# Patient Record
Sex: FEMALE | Race: WHITE | NOT HISPANIC OR LATINO
[De-identification: names, ages, dates, MRNs, and addresses within clinical notes are randomized per-mention and may not be internally consistent; named-entity substitution may affect disease eponyms.]

---

## 2017-05-27 ENCOUNTER — TRANSCRIPTION ENCOUNTER (OUTPATIENT)
Age: 67
End: 2017-05-27

## 2019-03-14 ENCOUNTER — INPATIENT (INPATIENT)
Facility: HOSPITAL | Age: 69
LOS: 7 days | Discharge: HOME | DRG: 510 | End: 2019-03-22
Attending: ORTHOPAEDIC SURGERY | Admitting: SURGERY
Payer: MEDICARE

## 2019-03-14 VITALS
DIASTOLIC BLOOD PRESSURE: 79 MMHG | OXYGEN SATURATION: 96 % | HEIGHT: 64 IN | SYSTOLIC BLOOD PRESSURE: 118 MMHG | TEMPERATURE: 98 F | WEIGHT: 145.06 LBS | RESPIRATION RATE: 18 BRPM | HEART RATE: 91 BPM

## 2019-03-14 DIAGNOSIS — S62.102A FRACTURE OF UNSPECIFIED CARPAL BONE, LEFT WRIST, INITIAL ENCOUNTER FOR CLOSED FRACTURE: ICD-10-CM

## 2019-03-14 DIAGNOSIS — I26.99 OTHER PULMONARY EMBOLISM WITHOUT ACUTE COR PULMONALE: ICD-10-CM

## 2019-03-14 DIAGNOSIS — Z98.890 OTHER SPECIFIED POSTPROCEDURAL STATES: Chronic | ICD-10-CM

## 2019-03-14 DIAGNOSIS — Z98.1 ARTHRODESIS STATUS: Chronic | ICD-10-CM

## 2019-03-14 DIAGNOSIS — S42.309A UNSPECIFIED FRACTURE OF SHAFT OF HUMERUS, UNSPECIFIED ARM, INITIAL ENCOUNTER FOR CLOSED FRACTURE: ICD-10-CM

## 2019-03-14 LAB
ALBUMIN SERPL ELPH-MCNC: 3.8 G/DL — SIGNIFICANT CHANGE UP (ref 3.3–5)
ALP SERPL-CCNC: 61 U/L — SIGNIFICANT CHANGE UP (ref 40–120)
ALT FLD-CCNC: 61 U/L — HIGH (ref 10–45)
ANION GAP SERPL CALC-SCNC: 13 MMOL/L — SIGNIFICANT CHANGE UP (ref 5–17)
ANION GAP SERPL CALC-SCNC: 14 MMOL/L — SIGNIFICANT CHANGE UP (ref 5–17)
APPEARANCE UR: CLEAR — SIGNIFICANT CHANGE UP
APTT BLD: 139 SEC — CRITICAL HIGH (ref 27.5–36.3)
APTT BLD: 25.2 SEC — LOW (ref 27.5–36.3)
AST SERPL-CCNC: 77 U/L — HIGH (ref 10–40)
BASOPHILS # BLD AUTO: 0 K/UL — SIGNIFICANT CHANGE UP (ref 0–0.2)
BASOPHILS NFR BLD AUTO: 0.6 % — SIGNIFICANT CHANGE UP (ref 0–2)
BILIRUB SERPL-MCNC: 0.3 MG/DL — SIGNIFICANT CHANGE UP (ref 0.2–1.2)
BILIRUB UR-MCNC: NEGATIVE — SIGNIFICANT CHANGE UP
BLD GP AB SCN SERPL QL: NEGATIVE — SIGNIFICANT CHANGE UP
BUN SERPL-MCNC: 21 MG/DL — SIGNIFICANT CHANGE UP (ref 7–23)
BUN SERPL-MCNC: 29 MG/DL — HIGH (ref 7–23)
CALCIUM SERPL-MCNC: 8.8 MG/DL — SIGNIFICANT CHANGE UP (ref 8.4–10.5)
CALCIUM SERPL-MCNC: 9.4 MG/DL — SIGNIFICANT CHANGE UP (ref 8.4–10.5)
CHLORIDE SERPL-SCNC: 100 MMOL/L — SIGNIFICANT CHANGE UP (ref 96–108)
CHLORIDE SERPL-SCNC: 100 MMOL/L — SIGNIFICANT CHANGE UP (ref 96–108)
CO2 SERPL-SCNC: 24 MMOL/L — SIGNIFICANT CHANGE UP (ref 22–31)
CO2 SERPL-SCNC: 25 MMOL/L — SIGNIFICANT CHANGE UP (ref 22–31)
COLOR SPEC: SIGNIFICANT CHANGE UP
CREAT SERPL-MCNC: 0.82 MG/DL — SIGNIFICANT CHANGE UP (ref 0.5–1.3)
CREAT SERPL-MCNC: 1.02 MG/DL — SIGNIFICANT CHANGE UP (ref 0.5–1.3)
DIFF PNL FLD: ABNORMAL
EOSINOPHIL # BLD AUTO: 0.1 K/UL — SIGNIFICANT CHANGE UP (ref 0–0.5)
EOSINOPHIL NFR BLD AUTO: 1.1 % — SIGNIFICANT CHANGE UP (ref 0–6)
GLUCOSE SERPL-MCNC: 130 MG/DL — HIGH (ref 70–99)
GLUCOSE SERPL-MCNC: 152 MG/DL — HIGH (ref 70–99)
GLUCOSE UR QL: NEGATIVE — SIGNIFICANT CHANGE UP
HCT VFR BLD CALC: 41.5 % — SIGNIFICANT CHANGE UP (ref 34.5–45)
HCT VFR BLD CALC: 41.9 % — SIGNIFICANT CHANGE UP (ref 34.5–45)
HGB BLD-MCNC: 13.8 G/DL — SIGNIFICANT CHANGE UP (ref 11.5–15.5)
HGB BLD-MCNC: 14.2 G/DL — SIGNIFICANT CHANGE UP (ref 11.5–15.5)
INR BLD: 1.13 RATIO — SIGNIFICANT CHANGE UP (ref 0.88–1.16)
KETONES UR-MCNC: NEGATIVE — SIGNIFICANT CHANGE UP
LEUKOCYTE ESTERASE UR-ACNC: NEGATIVE — SIGNIFICANT CHANGE UP
LYMPHOCYTES # BLD AUTO: 2 K/UL — SIGNIFICANT CHANGE UP (ref 1–3.3)
LYMPHOCYTES # BLD AUTO: 23.1 % — SIGNIFICANT CHANGE UP (ref 13–44)
MCHC RBC-ENTMCNC: 28.3 PG — SIGNIFICANT CHANGE UP (ref 27–34)
MCHC RBC-ENTMCNC: 28.4 PG — SIGNIFICANT CHANGE UP (ref 27–34)
MCHC RBC-ENTMCNC: 32.8 GM/DL — SIGNIFICANT CHANGE UP (ref 32–36)
MCHC RBC-ENTMCNC: 34.1 GM/DL — SIGNIFICANT CHANGE UP (ref 32–36)
MCV RBC AUTO: 83.5 FL — SIGNIFICANT CHANGE UP (ref 80–100)
MCV RBC AUTO: 86.1 FL — SIGNIFICANT CHANGE UP (ref 80–100)
MONOCYTES # BLD AUTO: 0.6 K/UL — SIGNIFICANT CHANGE UP (ref 0–0.9)
MONOCYTES NFR BLD AUTO: 7.4 % — SIGNIFICANT CHANGE UP (ref 2–14)
NEUTROPHILS # BLD AUTO: 5.7 K/UL — SIGNIFICANT CHANGE UP (ref 1.8–7.4)
NEUTROPHILS NFR BLD AUTO: 67.8 % — SIGNIFICANT CHANGE UP (ref 43–77)
NITRITE UR-MCNC: NEGATIVE — SIGNIFICANT CHANGE UP
PH UR: 7 — SIGNIFICANT CHANGE UP (ref 5–8)
PLATELET # BLD AUTO: 219 K/UL — SIGNIFICANT CHANGE UP (ref 150–400)
PLATELET # BLD AUTO: 237 K/UL — SIGNIFICANT CHANGE UP (ref 150–400)
POTASSIUM SERPL-MCNC: 2.8 MMOL/L — CRITICAL LOW (ref 3.5–5.3)
POTASSIUM SERPL-MCNC: 3.6 MMOL/L — SIGNIFICANT CHANGE UP (ref 3.5–5.3)
POTASSIUM SERPL-SCNC: 2.8 MMOL/L — CRITICAL LOW (ref 3.5–5.3)
POTASSIUM SERPL-SCNC: 3.6 MMOL/L — SIGNIFICANT CHANGE UP (ref 3.5–5.3)
PROT SERPL-MCNC: 6.9 G/DL — SIGNIFICANT CHANGE UP (ref 6–8.3)
PROT UR-MCNC: ABNORMAL
PROTHROM AB SERPL-ACNC: 12.9 SEC — SIGNIFICANT CHANGE UP (ref 10–12.9)
RBC # BLD: 4.87 M/UL — SIGNIFICANT CHANGE UP (ref 3.8–5.2)
RBC # BLD: 4.98 M/UL — SIGNIFICANT CHANGE UP (ref 3.8–5.2)
RBC # FLD: 12.2 % — SIGNIFICANT CHANGE UP (ref 10.3–14.5)
RBC # FLD: 13 % — SIGNIFICANT CHANGE UP (ref 10.3–14.5)
RH IG SCN BLD-IMP: POSITIVE — SIGNIFICANT CHANGE UP
RH IG SCN BLD-IMP: POSITIVE — SIGNIFICANT CHANGE UP
SODIUM SERPL-SCNC: 138 MMOL/L — SIGNIFICANT CHANGE UP (ref 135–145)
SODIUM SERPL-SCNC: 138 MMOL/L — SIGNIFICANT CHANGE UP (ref 135–145)
SP GR SPEC: >1.05 (ref 1.01–1.02)
UROBILINOGEN FLD QL: NEGATIVE — SIGNIFICANT CHANGE UP
WBC # BLD: 8.4 K/UL — SIGNIFICANT CHANGE UP (ref 3.8–10.5)
WBC # BLD: 8.4 K/UL — SIGNIFICANT CHANGE UP (ref 3.8–10.5)
WBC # FLD AUTO: 8.4 K/UL — SIGNIFICANT CHANGE UP (ref 3.8–10.5)
WBC # FLD AUTO: 8.4 K/UL — SIGNIFICANT CHANGE UP (ref 3.8–10.5)

## 2019-03-14 PROCEDURE — 73110 X-RAY EXAM OF WRIST: CPT | Mod: 26,LT

## 2019-03-14 PROCEDURE — 70486 CT MAXILLOFACIAL W/O DYE: CPT | Mod: 26

## 2019-03-14 PROCEDURE — 99223 1ST HOSP IP/OBS HIGH 75: CPT

## 2019-03-14 PROCEDURE — 74177 CT ABD & PELVIS W/CONTRAST: CPT | Mod: 26

## 2019-03-14 PROCEDURE — 70450 CT HEAD/BRAIN W/O DYE: CPT | Mod: 26

## 2019-03-14 PROCEDURE — 72125 CT NECK SPINE W/O DYE: CPT | Mod: 26

## 2019-03-14 PROCEDURE — 73130 X-RAY EXAM OF HAND: CPT | Mod: 26,50

## 2019-03-14 PROCEDURE — 72170 X-RAY EXAM OF PELVIS: CPT | Mod: 26

## 2019-03-14 PROCEDURE — 73110 X-RAY EXAM OF WRIST: CPT | Mod: 26,LT,77

## 2019-03-14 PROCEDURE — 71045 X-RAY EXAM CHEST 1 VIEW: CPT | Mod: 26

## 2019-03-14 PROCEDURE — 73090 X-RAY EXAM OF FOREARM: CPT | Mod: 26,LT

## 2019-03-14 PROCEDURE — 71260 CT THORAX DX C+: CPT | Mod: 26

## 2019-03-14 PROCEDURE — 73060 X-RAY EXAM OF HUMERUS: CPT | Mod: 26,RT

## 2019-03-14 RX ORDER — ACETAMINOPHEN 500 MG
325 TABLET ORAL EVERY 4 HOURS
Qty: 0 | Refills: 0 | Status: DISCONTINUED | OUTPATIENT
Start: 2019-03-14 | End: 2019-03-19

## 2019-03-14 RX ORDER — HEPARIN SODIUM 5000 [USP'U]/ML
5500 INJECTION INTRAVENOUS; SUBCUTANEOUS EVERY 6 HOURS
Qty: 0 | Refills: 0 | Status: DISCONTINUED | OUTPATIENT
Start: 2019-03-14 | End: 2019-03-17

## 2019-03-14 RX ORDER — HEPARIN SODIUM 5000 [USP'U]/ML
1100 INJECTION INTRAVENOUS; SUBCUTANEOUS
Qty: 25000 | Refills: 0 | Status: DISCONTINUED | OUTPATIENT
Start: 2019-03-14 | End: 2019-03-14

## 2019-03-14 RX ORDER — TETANUS TOXOID, REDUCED DIPHTHERIA TOXOID AND ACELLULAR PERTUSSIS VACCINE, ADSORBED 5; 2.5; 8; 8; 2.5 [IU]/.5ML; [IU]/.5ML; UG/.5ML; UG/.5ML; UG/.5ML
0.5 SUSPENSION INTRAMUSCULAR ONCE
Qty: 0 | Refills: 0 | Status: COMPLETED | OUTPATIENT
Start: 2019-03-14 | End: 2019-03-14

## 2019-03-14 RX ORDER — FENTANYL CITRATE 50 UG/ML
50 INJECTION INTRAVENOUS ONCE
Qty: 0 | Refills: 0 | Status: DISCONTINUED | OUTPATIENT
Start: 2019-03-14 | End: 2019-03-14

## 2019-03-14 RX ORDER — POTASSIUM CHLORIDE 20 MEQ
20 PACKET (EA) ORAL
Qty: 0 | Refills: 0 | Status: DISCONTINUED | OUTPATIENT
Start: 2019-03-14 | End: 2019-03-14

## 2019-03-14 RX ORDER — SUMATRIPTAN SUCCINATE 4 MG/.5ML
1 INJECTION, SOLUTION SUBCUTANEOUS
Qty: 0 | Refills: 0 | COMMUNITY

## 2019-03-14 RX ORDER — TOPIRAMATE 25 MG
100 TABLET ORAL
Qty: 0 | Refills: 0 | Status: DISCONTINUED | OUTPATIENT
Start: 2019-03-14 | End: 2019-03-19

## 2019-03-14 RX ORDER — POTASSIUM CHLORIDE 20 MEQ
40 PACKET (EA) ORAL EVERY 4 HOURS
Qty: 0 | Refills: 0 | Status: COMPLETED | OUTPATIENT
Start: 2019-03-14 | End: 2019-03-15

## 2019-03-14 RX ORDER — TOPIRAMATE 25 MG
1 TABLET ORAL
Qty: 0 | Refills: 0 | COMMUNITY

## 2019-03-14 RX ORDER — VENLAFAXINE HCL 75 MG
1 CAPSULE, EXT RELEASE 24 HR ORAL
Qty: 0 | Refills: 0 | COMMUNITY

## 2019-03-14 RX ORDER — HEPARIN SODIUM 5000 [USP'U]/ML
5000 INJECTION INTRAVENOUS; SUBCUTANEOUS ONCE
Qty: 0 | Refills: 0 | Status: DISCONTINUED | OUTPATIENT
Start: 2019-03-14 | End: 2019-03-14

## 2019-03-14 RX ORDER — OXYCODONE AND ACETAMINOPHEN 5; 325 MG/1; MG/1
2 TABLET ORAL EVERY 6 HOURS
Qty: 0 | Refills: 0 | Status: DISCONTINUED | OUTPATIENT
Start: 2019-03-14 | End: 2019-03-17

## 2019-03-14 RX ORDER — HYDROCHLOROTHIAZIDE 25 MG
3.75 TABLET ORAL
Qty: 0 | Refills: 0 | COMMUNITY

## 2019-03-14 RX ORDER — OXYCODONE AND ACETAMINOPHEN 5; 325 MG/1; MG/1
1 TABLET ORAL EVERY 4 HOURS
Qty: 0 | Refills: 0 | Status: DISCONTINUED | OUTPATIENT
Start: 2019-03-14 | End: 2019-03-17

## 2019-03-14 RX ORDER — HEPARIN SODIUM 5000 [USP'U]/ML
5500 INJECTION INTRAVENOUS; SUBCUTANEOUS ONCE
Qty: 0 | Refills: 0 | Status: COMPLETED | OUTPATIENT
Start: 2019-03-14 | End: 2019-03-14

## 2019-03-14 RX ORDER — ACETAMINOPHEN 500 MG
1000 TABLET ORAL ONCE
Qty: 0 | Refills: 0 | Status: COMPLETED | OUTPATIENT
Start: 2019-03-14 | End: 2019-03-14

## 2019-03-14 RX ORDER — METOPROLOL TARTRATE 50 MG
25 TABLET ORAL DAILY
Qty: 0 | Refills: 0 | Status: DISCONTINUED | OUTPATIENT
Start: 2019-03-14 | End: 2019-03-19

## 2019-03-14 RX ORDER — LIDOCAINE 4 G/100G
1 CREAM TOPICAL DAILY
Qty: 0 | Refills: 0 | Status: DISCONTINUED | OUTPATIENT
Start: 2019-03-14 | End: 2019-03-19

## 2019-03-14 RX ORDER — HYDROMORPHONE HYDROCHLORIDE 2 MG/ML
0.5 INJECTION INTRAMUSCULAR; INTRAVENOUS; SUBCUTANEOUS EVERY 4 HOURS
Qty: 0 | Refills: 0 | Status: DISCONTINUED | OUTPATIENT
Start: 2019-03-14 | End: 2019-03-19

## 2019-03-14 RX ORDER — METOPROLOL TARTRATE 50 MG
25 TABLET ORAL DAILY
Qty: 0 | Refills: 0 | Status: DISCONTINUED | OUTPATIENT
Start: 2019-03-14 | End: 2019-03-14

## 2019-03-14 RX ORDER — HEPARIN SODIUM 5000 [USP'U]/ML
INJECTION INTRAVENOUS; SUBCUTANEOUS
Qty: 25000 | Refills: 0 | Status: DISCONTINUED | OUTPATIENT
Start: 2019-03-14 | End: 2019-03-19

## 2019-03-14 RX ORDER — VENLAFAXINE HCL 75 MG
150 CAPSULE, EXT RELEASE 24 HR ORAL DAILY
Qty: 0 | Refills: 0 | Status: DISCONTINUED | OUTPATIENT
Start: 2019-03-14 | End: 2019-03-19

## 2019-03-14 RX ORDER — PIPERACILLIN AND TAZOBACTAM 4; .5 G/20ML; G/20ML
3.38 INJECTION, POWDER, LYOPHILIZED, FOR SOLUTION INTRAVENOUS ONCE
Qty: 0 | Refills: 0 | Status: DISCONTINUED | OUTPATIENT
Start: 2019-03-14 | End: 2019-03-14

## 2019-03-14 RX ORDER — HEPARIN SODIUM 5000 [USP'U]/ML
2500 INJECTION INTRAVENOUS; SUBCUTANEOUS EVERY 6 HOURS
Qty: 0 | Refills: 0 | Status: DISCONTINUED | OUTPATIENT
Start: 2019-03-14 | End: 2019-03-17

## 2019-03-14 RX ADMIN — OXYCODONE AND ACETAMINOPHEN 2 TABLET(S): 5; 325 TABLET ORAL at 18:12

## 2019-03-14 RX ADMIN — Medication 1000 MILLIGRAM(S): at 07:11

## 2019-03-14 RX ADMIN — FENTANYL CITRATE 50 MICROGRAM(S): 50 INJECTION INTRAVENOUS at 11:09

## 2019-03-14 RX ADMIN — Medication 100 MILLIGRAM(S): at 17:35

## 2019-03-14 RX ADMIN — Medication 25 MILLIGRAM(S): at 17:40

## 2019-03-14 RX ADMIN — OXYCODONE AND ACETAMINOPHEN 2 TABLET(S): 5; 325 TABLET ORAL at 17:39

## 2019-03-14 RX ADMIN — LIDOCAINE 1 PATCH: 4 CREAM TOPICAL at 17:39

## 2019-03-14 RX ADMIN — FENTANYL CITRATE 50 MICROGRAM(S): 50 INJECTION INTRAVENOUS at 04:38

## 2019-03-14 RX ADMIN — HEPARIN SODIUM 1000 UNIT(S)/HR: 5000 INJECTION INTRAVENOUS; SUBCUTANEOUS at 20:13

## 2019-03-14 RX ADMIN — HEPARIN SODIUM 0 UNIT(S)/HR: 5000 INJECTION INTRAVENOUS; SUBCUTANEOUS at 18:58

## 2019-03-14 RX ADMIN — TETANUS TOXOID, REDUCED DIPHTHERIA TOXOID AND ACELLULAR PERTUSSIS VACCINE, ADSORBED 0.5 MILLILITER(S): 5; 2.5; 8; 8; 2.5 SUSPENSION INTRAMUSCULAR at 04:37

## 2019-03-14 RX ADMIN — HEPARIN SODIUM 5500 UNIT(S): 5000 INJECTION INTRAVENOUS; SUBCUTANEOUS at 12:08

## 2019-03-14 RX ADMIN — FENTANYL CITRATE 50 MICROGRAM(S): 50 INJECTION INTRAVENOUS at 07:11

## 2019-03-14 RX ADMIN — LIDOCAINE 1 PATCH: 4 CREAM TOPICAL at 19:12

## 2019-03-14 RX ADMIN — Medication 400 MILLIGRAM(S): at 04:38

## 2019-03-14 RX ADMIN — Medication 40 MILLIEQUIVALENT(S): at 22:33

## 2019-03-14 RX ADMIN — HEPARIN SODIUM 1200 UNIT(S)/HR: 5000 INJECTION INTRAVENOUS; SUBCUTANEOUS at 12:11

## 2019-03-14 RX ADMIN — Medication 40 MILLIEQUIVALENT(S): at 18:40

## 2019-03-14 NOTE — ED PROVIDER NOTE - NS ED ROS FT
CONSTITUTIONAL: No fevers, no chills  Eyes: no visual changes  Ears: no ear drainage, no ear pain  Nose: no nasal congestion  Mouth/Throat: no sore throat  Cardiovascular: +Chest pain  Respiratory: No SOB  Gastrointestinal: No n/v/d, + abd pain  Genitourinary: no dysuria, no hematuria  SKIN: no rashes.  NEURO: no headache  MSK: +neck pain in c-collar

## 2019-03-14 NOTE — ED ADULT NURSE REASSESSMENT NOTE - NS ED NURSE REASSESS COMMENT FT1
Received report from VIKTORIA Rodriguez. Pt A&Ox3, resting, VS stable. Safety checked. pt remains in c-collar, family at bedside, pt states she is feeling better. Comfort care provided. Pt encouraged to call for assist when needed. admitted MD re-assess/dispo Received report from VIKTORIA Rodriguez. Pt A&Ox3, resting, VS stable. Safety checked. pt remains in c-collar, family at bedside, pt states she is feeling better. Comfort care provided. Pt encouraged to call for assist when needed. ortho at bedside to repair L wrist. pending US B/L lower extremities and MD re-assess/dispo

## 2019-03-14 NOTE — ED PROVIDER NOTE - CARE PLAN
Principal Discharge DX:	Pulmonary emboli  Secondary Diagnosis:	Rib fractures  Secondary Diagnosis:	Nose fracture  Secondary Diagnosis:	Distal radius fracture, left

## 2019-03-14 NOTE — H&P ADULT - NSHPPHYSICALEXAM_GEN_ALL_CORE
General: AOx3, NAD  HEENT: blood in nares, midface stable  Neck: nontender, FROM without pain  Chest: R chest wall tenderness  Abd: soft, nontender, nondistended, pelvis stable  Extremities: L wrist deformity, L hand strength limited by pain  CV: normotensive, regular rate  Pulm: nonlabored

## 2019-03-14 NOTE — CONSULT NOTE ADULT - NSICDXPROBLEM_GEN_ALL_CORE_FT
PROBLEM DIAGNOSES  Problem: Fracture of wrist, left, closed, initial encounter  Recommendation: scheduled for Open reduction and internal fixation of wrist  No contraindication to scheduled procedure  pain meds as needed      Problem: Multiple fractures of upper extremity and ribs  Recommendation: continue to follow rib fxs   pain meds aas needed  encourage incentive spiromery     Problem: Pulmonary thromboembolism  Recommendation:

## 2019-03-14 NOTE — H&P ADULT - ASSESSMENT
Patient is a 68y year old female with PMHx of HTN, HLD, migraine, osteopenia, anxiety, and lumbar spinal fusion c/b radicular symptoms and gait instability who presented to the ED after a mechanical fall down 5 step and striking her face against a wall.     Injuries:  1. Nondisplaced Anterior Nasal Spine Fracture  2. Acute Displaced Fractures of R 5th/6th Ribs  3. Ulnar Subluxation of the Proximal Phalanx of the Right Thumb at the MCP  4. Acute Comminuted Impacted Fracture of the Left Distal Radius    Other Findings:  1. R Nephrolithiasis  2. Endometrial Thickening  3. RLL Nodule  4. Thyroid Nodule     Plan:  - Heparin gtt to PE  - BLE US to r/o DVT  - Ortho: plan to take to OR for fixation, will discuss anticoagulation plan  - Plastics: will evaluate for nasal bone fracture and thumb subluxation  - Regular diet; NPO @ [facility] for OR  - Multimodal pain control  - Incentive spirometer    Elias Mejia, PGY2  [phone] Patient is a 68y year old female with PMHx of HTN, HLD, migraine, osteopenia, anxiety, and lumbar spinal fusion c/b radicular symptoms and gait instability who presented to the ED after a mechanical fall down 5 step and striking her face against a wall.     Injuries:  1. Nondisplaced Anterior Nasal Spine Fracture  2. Acute Displaced Fractures of R 5th/6th Ribs  3. Ulnar Subluxation of the Proximal Phalanx of the Right Thumb at the MCP  4. Acute Comminuted Impacted Fracture of the Left Distal Radius    Other Findings:  1. R Nephrolithiasis  2. Endometrial Thickening  3. RLL Nodule  4. Thyroid Nodule     Plan:  - Heparin gtt to PE  - BLE US to r/o DVT  - Ortho: plan to take to OR for fixation, will discuss anticoagulation plan. No contraindication to orthopedic sx plans for the OR, cleared from trauma sx team's perspective.    - Plastics: will evaluate for nasal bone fracture and thumb subluxation  - Regular diet; NPO @ [facility] for OR  - Multimodal pain control  - Incentive spirometer    Elias Mejia, PGY2  [phone]

## 2019-03-14 NOTE — ED ADULT NURSE REASSESSMENT NOTE - NS ED NURSE REASSESS COMMENT FT1
Report received from Lulu BLUM. AOx3, speaking in complete sentences. Pt denies pain at this time. Second heplock placed, heparin infusing as ordered for PE at rate of 12 ml/hr. Awaiting US of lower legs and bed placement at this time. Pt aware of plan of care.

## 2019-03-14 NOTE — ED ADULT NURSE NOTE - OBJECTIVE STATEMENT
68y female presents to ED complaining of a fall. Pt is a/ox4 states that she tripped and fell down some steps at home, hitting her head on the wall. Pt presents via EMS complaining of neck pain, back pain, L wrist pain in C collar and L wrist splint. Pt has dried blood on nose, small abrasion on R eyelid and obvious deformity to L wrist with pulses and sensation present. Pt tender to palp no R chest wall and R upper abdomen. Pt breathing spontaneous and unlabored with lungs clear to auscultation bilaterally. Pt skin is warm, dry and intact with no edema present. Pt abdomen soft, nontender, nondistended. Pt PERRL, with equal strength bilaterally in upper and lower extremities with full sensation.

## 2019-03-14 NOTE — ED ADULT NURSE NOTE - NSIMPLEMENTINTERV_GEN_ALL_ED
Implemented All Fall Risk Interventions:  [place] to call system. Call bell, personal items and telephone within reach. Instruct patient to call for assistance. Room bathroom lighting operational. Non-slip footwear when patient is off stretcher. Physically safe environment: no spills, clutter or unnecessary equipment. Stretcher in lowest position, wheels locked, appropriate side rails in place. Provide visual cue, wrist band, yellow gown, etc. Monitor gait and stability. Monitor for mental status changes and reorient to person, place, and time. Review medications for side effects contributing to fall risk. Reinforce activity limits and safety measures with patient and family.

## 2019-03-14 NOTE — H&P ADULT - ATTENDING COMMENTS
68F fell down stairs, no LOC  seen and examined 03-14-19 @ 2210 as trauma consult    right eyebrow bruising  dry blood in bilateral nares  right lateral chest wall and inferior costal margin tenderness    right anterior and lateral 4-5 minimally displaced rib fractures (radiologic flail)  -pain control    left distal radius Colles fracture  -ORIF by ortho planned for tomorrow    bilateral nasal bone fractures  -f/u ENT as outpatient    LLL subsegmental PE's  bilateral LE DVT (right popliteal and tibial veins, left soleal vein)  -anticoagulate    endometrial thickening  -TVUS and gyn consult to evaluate for endometrial cancer (this could be a risk factor for VTE in addition to recent transatl[place] airplane ride)

## 2019-03-14 NOTE — CONSULT NOTE ADULT - ATTENDING COMMENTS
Agree with above exam and plan. All RBAs discussed. All questions answered. Informed cosnent obtained.

## 2019-03-14 NOTE — ED PROVIDER NOTE - ATTENDING CONTRIBUTION TO CARE
I performed a history and physical exam of the patient and discussed their management with the resident. I reviewed the resident's note and agree with the documented findings and plan of care. I have edited as appropriate. My medical decision making and observations are found above.

## 2019-03-14 NOTE — ED PROVIDER NOTE - MUSCULOSKELETAL, MLM
In C-collar no stepoffs, +C-spine tenderness. In C-collar no stepoffs, +C-spine tenderness. right thumb deformity at 1st mcp, pt able to range. + gross deformity of left wrist with ttp. 2+ pulses and cap refill <2sec.

## 2019-03-14 NOTE — H&P ADULT - NSICDXPASTMEDICALHX_GEN_ALL_CORE_FT
PAST MEDICAL HISTORY:  Anxiety     HLD (hyperlipidemia)     Hypertension     Low back pain potentially associated with radiculopathy     Migraine     Osteopenia

## 2019-03-14 NOTE — CONSULT NOTE ADULT - SUBJECTIVE AND OBJECTIVE BOX
Trauma Consult      CC: L Wrist Pain, L Chest Wall Pain      Patient is a 68y year old female with PMHx of HTN, HLD, migraine, osteopenia, anxiety, and lumbar spinal fusion c/b radicular symptoms and gait instability who presented to the ED after a mechanical fall down 5 step and striking her face against a wall.     HPI:      Primary Survey  A - airway intact  B - bilateral breath sounds and good chest rise  C - initially BP: 106/70 (03-14-19 @ 07:10), palpable pulses in all extremities  D - GCS 15 on arrival  Exposure obtained      Secondary survey  Gen: NAD  HEENT: NC/AT  CV: s1, s2, RRR  Pulm: CTA B/L  Chest: No TTP  Abd: Soft, ND, NT, no rebound, no guarding  Groin: Normal appearing  Ext: Palp radial b/l, palp DP b/l  Back: no TTP, no palpable runoff, stepoff, or deformity    PMH  HLD (hyperlipidemia)    PSH    MEDS    Allergies    No Known Allergies    Intolerances        Social    Labs:                        14.2   8.4   )-----------( 237      ( 14 Mar 2019 04:27 )             41.5     03-14    138  |  100  |  29<H>  ----------------------------<  130<H>  3.6   |  24  |  1.02    Ca    9.4      14 Mar 2019 04:27    TPro  6.9  /  Alb  3.8  /  TBili  0.3  /  DBili  x   /  AST  77<H>  /  ALT  61<H>  /  AlkPhos  61  03-14    PT/INR - ( 14 Mar 2019 04:27 )   PT: 12.9 sec;   INR: 1.13 ratio         PTT - ( 14 Mar 2019 04:27 )  PTT:25.2 sec          Imaging

## 2019-03-14 NOTE — ED ADULT NURSE NOTE - NSSISCREENINGQ2_ED_A_ED
10/23/2017  Samir CINTRON [facility]    DIABETES HOME INSTRUCTIONS    Meal Planning: Decrease Dr. Pepper by one glass per day initiate a glass of milk in place or water  Physical Activity: Continue to walk the dog every day     Diabetes Medication: Increase Lantus to 20 units in the evening and continue Metformin    Blood Sugar Monitoring:  Check 1times a day, before breakfast and. Check if you are not feeling well. Record all blood sugar results and bring to follow-up appointments.    Your Blood Sugar Target is:   before meals; Less than 180 two hours after the start of a meal.    Call Your Doctor Or The Educator If Blood Sugar Is:  Higher than 300 mg/dL or lower than 70 mg/dL twice in a week.    Call Your Doctor For:    blood sugar test strips, lancets, and refills of your medication.    Plan/Recommendations:  1) Increase Lantus to 20 units in the evening.   2)Continue Metformin 1000 mg twice daily.  3) Start decreasing Dr. Pepper. Leave and extra glass in the bottle everyday this week. Change that extra glass to water.  4) Start testing fasting blood sugar daily.  5) Educator will call  on Friday to review blood sugars.        We Suggest Making An Appointment With Your  Doctor's Office (at least every 3-6 months, Eye Doctor (at least yearly), Dentist (at least every 6 months), Foot Doctor (as needed)    Thank you.  Please call me with any questions or concerns.   Virginia Kumar, RN, CDE  Diabetes Nurse Educator      No

## 2019-03-14 NOTE — CONSULT NOTE ADULT - ASSESSMENT
A/P: 68y Female with L distal radius, closed.  Pain control  NWB LUE in splint, keep c/d/I,   Ice/elevation  Si/sx compartment syndrome discussed with patient, told to alert nurse if she develops symptoms  Patient will need operative fixation of left distal radius fracture  Plan for OR tomorrow 3/15 for OR if cleared by TSX/Medcine  Please document clearance in chart  Please hold [employer] AC for OR after midnight if OK with team given presence of PEs  NPO after midnight for OR tomorrow  IVF while NPO  Discussed with Dr. Prado who agrees with above A/P: 68y Female with L distal radius, closed.  Pain control  NWB LUE in splint, keep c/d/I,   Ice/elevation  Si/sx compartment syndrome discussed with patient, told to alert nurse if she develops symptoms  Patient will need operative fixation of left distal radius fracture  Plan for OR tomorrow 3/15 for OR if cleared by TSX/Medcine  Please document clearance in chart  Plan to hold heparin drip 4 hours prior to OR with restart 4 hours post-op  NPO after midnight for OR tomorrow  IVF while NPO  Discussed with Dr. Prado who agrees with above

## 2019-03-14 NOTE — CONSULT NOTE ADULT - ASSESSMENT
69 yo woman with hx of fall at home found to have a left Wrist fx as well as rib fractures. On CT pt found to have subsegmental PEs

## 2019-03-14 NOTE — CONSULT NOTE ADULT - SUBJECTIVE AND OBJECTIVE BOX
68 YOF not on ac s/p fall after slipping on step, pt fell down multiple steps <5 unsure pt was going down stairs in dark. Pt unable to get back up, has left wrist deformity. Pt complaining of neck pain, right sided chest pain, right upper quadrant abdominal pain, generalized back pain. Pt denies any LOC, denies fever, chills cough, no lightheadedness prior to fall, no chest pain prior to fall. Patient found to have a left wrist fx and rib fxs.    MEDICATIONS  (STANDING):  heparin  Infusion.  Unit(s)/Hr (12 mL/Hr) IV Continuous <Continuous>  lidocaine   Patch 1 Patch Transdermal daily  metoprolol succinate ER 25 milliGRAM(s) Oral daily  potassium chloride    Tablet ER 40 milliEquivalent(s) Oral every 4 hours  topiramate 100 milliGRAM(s) Oral two times a day  venlafaxine XR. 150 milliGRAM(s) Oral daily    MEDICATIONS  (PRN):  acetaminophen   Tablet .. 325 milliGRAM(s) Oral every 4 hours PRN Mild Pain (1 - 3)  heparin  Injectable 2500 Unit(s) IV Push every 6 hours PRN For aPTT between 40 - 57  heparin  Injectable 5500 Unit(s) IV Push every 6 hours PRN For aPTT less than 40  HYDROmorphone  Injectable 0.5 milliGRAM(s) IV Push every 4 hours PRN break through pain  oxyCODONE    5 mG/acetaminophen 325 mG 1 Tablet(s) Oral every 4 hours PRN Moderate Pain (4 - 6)  oxyCODONE    5 mG/acetaminophen 325 mG 2 Tablet(s) Oral every 6 hours PRN Severe Pain (7 - 10)          VITALS:   T(C): 36.7 (03-14-19 @ 17:12), Max: 36.7 (03-14-19 @ 17:12)  HR: 71 (03-14-19 @ 17:12) (67 - 91)  BP: 111/71 (03-14-19 @ 17:12) (99/68 - 118/79)  RR: 20 (03-14-19 @ 17:12) (16 - 20)  SpO2: 95% (03-14-19 @ 17:12) (95% - 100%)  Wt(kg): --    Physical Exam  General: WN/WD NAD  Neurology: A&Ox3, nonfocal, [place] x 4  Eyes: PERRLA/ EOMI, Gross vision intact  ENT/Neck: Neck supple, trachea midline, No JVD, Gross hearing intact  Respiratory: decreased effort due to pain  CV: RRR, S1S2, no murmurs, rubs or gallops  Abdominal: Soft, NT, ND +BS,   Extremities: left wrist in splint  Skin: No Rashes, Hematoma, Ecchymosis      LABS:        CBC Full  -  ( 14 Mar 2019 18:04 )  WBC Count : 8.4 K/uL  Hemoglobin : 13.8 g/dL  Hematocrit : 41.9 %  Platelet Count - Automated : 219 K/uL  Mean Cell Volume : 86.1 fl  Mean Cell Hemoglobin : 28.3 pg  Mean Cell Hemoglobin Concentration : 32.8 gm/dL  Auto Neutrophil # : x  Auto Lymphocyte # : x  Auto Monocyte # : x  Auto Eosinophil # : x  Auto Basophil # : x  Auto Neutrophil % : x  Auto Lymphocyte % : x  Auto Monocyte % : x  Auto Eosinophil % : x  Auto Basophil % : x    03-14    138  |  100  |  21  ----------------------------<  152<H>  2.8<LL>   |  25  |  0.82    Ca    8.8      14 Mar 2019 17:04    TPro  6.9  /  Alb  3.8  /  TBili  0.3  /  DBili  x   /  AST  77<H>  /  ALT  61<H>  /  AlkPhos  61  03-14    LIVER FUNCTIONS - ( 14 Mar 2019 04:27 )  Alb: 3.8 g/dL / Pro: 6.9 g/dL / ALK PHOS: 61 U/L / ALT: 61 U/L / AST: 77 U/L / GGT: x           PT/INR - ( 14 Mar 2019 04:27 )   PT: 12.9 sec;   INR: 1.13 ratio         PTT - ( 14 Mar 2019 18:04 )  PTT:139.0 sec  Urinalysis Basic - ( 14 Mar 2019 07:26 )    Color: Light Yellow / Appearance: Clear / SG: >1.050 / pH: x  Gluc: x / Ketone: Negative  / Bili: Negative / Urobili: Negative   Blood: x / Protein: Trace / Nitrite: Negative   Leuk Esterase: Negative / RBC: 5 /hpf / WBC 1 /HPF   Sq Epi: x / Non Sq Epi: 2 /hpf / Bacteria: Negative    < from: CT Chest w/ IV Cont (03.14.19 @ 04:34) >    EXAM:  CT ABDOMEN AND PELVIS IC                          EXAM:  CT CHEST IC                            PROCEDURE DATE:  03/14/2019            INTERPRETATION:  CLINICAL INFORMATION: Status post fall down 5-6 steps.    Right-sided chest pain and right upper quadrant pain.    COMPARISON: None.    PROCEDURE:   CT of the Chest, Abdomen and Pelvis was performed with intravenous   contrast.   Imaging was performed through the chest in the arterial phase followed by   imaging of the abdomen and pelvis in the portal venous phase.  Intravenous contrast: 90 ml Omnipaque 350. 10 ml discarded.  Oral contrast:None.  Sagittal and coronal reformats were performed.    FINDINGS:    CHEST:     LUNGS AND LARGE AIRWAYS: Patent central airways.  Bibasilar subsegmental   atelectasis.  2.5 mm right upper lobe pulmonary nodule (2:31).  Nodular   opacity in the right lower lobe measures 5.5 x 4 mm (4:309).  PLEURA: No   pleural effusion or pneumothorax.  VESSELS: No acute traumatic aortic injury.  There are small segmental   subsegmental pulmonary emboli in the medial basal and anterior basal   segments of the left lower lobe.  HEART: Heart size is normal. No pericardial effusion.  MEDIASTINUM AND ALKA: No lymphadenopathy.  CHEST WALL AND LOWER NECK: Heterogeneous left thyroid nodules measuring   2.5 x 2.4 cm (2:11) and 2.3 x 1.8 cm (2:14).  Right thyroid nodule   measuring 1.4 x 1 cm (2:7).  BONES: Mildly displaced fractures of the lateral right fifth and sixth   ribs.  Approximately 4.5 x 2 cm lesion in the proximal right humerus   appears to contain chondroid matrix and probably represent an   enchondroma.      ABDOMEN AND PELVIS:         LIVER: A 4-5 mm hypoattenuating focus in segment 7 (3:28) is too small to   characterize by CT scan.  BILE DUCTS: Normal caliber.  GALLBLADDER: Within normal limits.  SPLEEN: Within normal limits.  PANCREAS: Within normal limits.  ADRENALS: Within normal limits.  KIDNEYS/URETERS: Kidneys enhance symmetrically without hydronephrosis.    There are two punctate nonobstructing right lower pole renal stones that   both measure less than 2 mm (4:565 and 4:568).  A 3 mm hypoattenuating   focus in the right kidney (3:40) is too small to characterize by CT scan.    BLADDER: Within normal limits.  REPRODUCTIVE ORGANS: Endometrium measures approximately 7 mm.  A 1.8 x   1.1 cm lesion along the left side of the uterine fundus appears separate   from the left ovary and may reflect a pedunculated, subserosal fibroid   (3:87).     BOWEL: No bowel obstruction.  Appendix not visualized; no secondary   signs of appendicitis.  No colonic diverticular disease.  PERITONEUM: No   hemoperitoneum, ascites or free air.  VESSELS:  There is a sort segment severe stenosis in the proximal celiac   trunk which appears due to compression from the median arcuate ligament.  RETROPERITONEUM: No retroperitoneal hemorrhage.  No lymphadenopathy.    ABDOMINAL WALL: Within normal limits.  BONES: Thoracolumbar scoliosis and multilevel degenerative changes in the   spine.  The patient is status post L4-S1 posterior spinal fusion with   left-sided pedicle screws at L4, L5 and S1.  There are also interbody   fusions at L4-L5 and L5-S1.  Spinal hardware appears intact.  There is   bony ankylosis of the facet joints at L4-L5 bilaterally and L5-S1 on the   left.  Bony pelvis is intact.  A comminuted impacted fracture of the   distal left radius is incidentally noted.    CRITICAL VALUE: Acute pulmonary embolism was reported to Dr. Palacios in the   emergency department on 03/14/2019 at 5:00 AM.  Critical value policy of   the hospital was followed. Read back and confirmation of receipt of this   communication was performed. This verbal communication supplements the   text report of this document.    IMPRESSION:        CHEST:  1.  Acute displaced fractures of the right fifth and sixth ribs.  No   focal infiltrate, pleural effusion or pneumothorax.  2.  Small segmental and subsegmental pulmonary emboli are seen in the   medial basal and anterior basal segments of the left lower lobe.  3.  Right lower lobe nodular opacity measures 5.5 x 4 mm.  Based on 2017   Fleischner Society criteria this does not necessitate any additional   follow-up imaging.  However, if the patient is a smoker or has other risk   factors for lung cancer an optional follow-up chest CT scan can be   considered in twelve months to exclude lesion growth.  4.  Heterogeneous thyroid nodules measuring up to 2.5 cm.  Outpatient   thyroid ultrasound is recommended for further characterization.  5.  Bone lesion in the proximal right humerus probably represents an   enchondroma.  Dedicated humerus radiographs can be obtained for further   characterization.    ABDOMEN/PELVIS:  1.  Comminuted displaced fracture of the distal left radius is   incidentally noted.  Otherwise there is no evidence of acute traumatic   injury in the abdomen or pelvis.  2.  Endometrium measures approximately 7 mm which is thickened for the   patient's age.  Vaginal bleeding should be excluded clinically.  A 1.8 x   1.1 cm lesion along the left side of the uterine fundus appears separate   from the left ovary may reflect a pedunculated, subserosal fibroid.    Pelvic ultrasound is recommended for further characterization.  3. 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  Median arcuate ligament syndrome should be excluded   clinically.  4.  Punctate nonobstructing right lower pole renal stones.

## 2019-03-14 NOTE — H&P ADULT - HISTORY OF PRESENT ILLNESS
Patient is a 68y year old female with PMHx of HTN, HLD, migraine, osteopenia, anxiety, and lumbar spinal fusion c/b radicular symptoms and gait instability who presented to the ED after a mechanical fall down 5 step and striking her face against a wall.     The patient states that she was walking down the stairs in the dark, when she missed a step, falling forward. She did not fall to the floor, but was unable to catch her balance, and struck her face against the wall at the bottom of the steps. The patient denies any LOC as a results of the accident. She was ambulatory immediately afterwards.     Upon presentation to the ED, the patient complained of left wrist pain, right chest wall pain, and nose pain. The patient denied any vision changed, headache, neck pain, back pain, abdominal pain, nausea, dizziness, or lightheadedness.

## 2019-03-14 NOTE — H&P ADULT - NSHPLABSRESULTS_GEN_ALL_CORE
CBC (03-14 @ 04:27)                              14.2                           8.4     )----------------(  237        67.8  % Neutrophils, 23.1  % Lymphocytes, ANC: 5.7                                 41.5                  BMP (03-14 @ 04:27)             138     |  100     |  29<H> 		Ca++ --      Ca 9.4                ---------------------------------( 130<H>		Mg --                 3.6     |  24      |  1.02  			Ph --        LFTs (03-14 @ 04:27)      TPro 6.9 / Alb 3.8 / TBili 0.3 / DBili -- / AST 77<H> / ALT 61<H> / AlkPhos 61    Coags (03-14 @ 04:27)  aPTT 25.2<L> / INR 1.13 / PT 12.9    CT Head No Cont (03.14.19 @ 04:47)     CT Head: No acute intracranial hemorrhage or calvarial fracture. Right   frontal scalp soft tissue swelling.    CT maxillofacial: Question nondisplaced fracture of the anterior nasal   spine. No additional maxillofacial bone fracture.    CT cervical spine: No acute cervical spine fracture or evidence of   traumatic malalignment.    Rounded partially calcified lesion posterior and inferior to the left   thyroid lobe. Further evaluation with nonemergent ultrasound is   recommended.    CHEST (03.14.19 @ 04:34):  1.  Acute displaced fractures of the right fifth and sixth ribs.  No   focal infiltrate, pleural effusion or pneumothorax.  2.  Small segmental and subsegmental pulmonary emboli are seen in the   medial basal and anterior basal segments of the left lower lobe.  3.  Right lower lobe nodular opacity measures 5.5 x 4 mm.  Based on 2017   Fleischner Society criteria this does not necessitate any additional   follow-up imaging.  However, if the patient is a smoker or has other risk   factors for lung cancer an optional follow-up chest CT scan can be   considered in twelve months to exclude lesion growth.  4.  Heterogeneous thyroid nodules measuring up to 2.5 cm.  Outpatient   thyroid ultrasound is recommended for further characterization.  5.  Bone lesion in the proximal right humerus probably represents an   enchondroma.  Dedicated humerus radiographs can be obtained for further   characterization.    ABDOMEN/PELVIS (03.14.19 @ 04:34):  1.  Comminuted displaced fracture of the distal left radius is   incidentally noted.  Otherwise there is no evidence of acute traumatic   injury in the abdomen or pelvis.  2.  Endometrium measures approximately 7 mm which is thickened for the   patient's age.  Vaginal bleeding should be excluded clinically.  A 1.8 x   1.1 cm lesion along the left side of the uterine fundus appears separate   from the left ovary may reflect a pedunculated, subserosal fibroid.    Pelvic ultrasound is recommended for further characterization.  3. 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  Median arcuate ligament syndrome should be excluded   clinically.  4.  Punctate nonobstructing right lower pole renal stones.    Xray Hand 3 Views, Bilateral (03.14.19 @ 05:09)   1.  Ulnar subluxation of the proximal phalanx of the right thumb at the   metacarpophalangeal joint.  2.  Acute comminuted impacted fracture of the distal left radius with   associated soft tissue swelling.     Xray Wrist 3 Views, Left (03.14.19 @ 05:09)   Acute comminuted impacted fracture of the left distal radius with   associated soft tissue swelling.

## 2019-03-14 NOTE — ED PROVIDER NOTE - OBJECTIVE STATEMENT
68 YOF not on ac s/p fall after slip 68 YOF not on ac s/p fall after slipping on step, pt fell down multiple steps <5 unsure pt was going down stairs in dark. Pt unable to get back up, has left wrist deformity. Pt complaining of neck pain, right sided chest pain, right upper quadrant abdominal pain, generalized back pain. Pt denies any LOC, denies fever, chills cough, no lightheadedness prior to fall, no chest pain prior to fall.

## 2019-03-14 NOTE — CONSULT NOTE ADULT - SUBJECTIVE AND OBJECTIVE BOX
68y Female community ambulatory RHD, presents c/o L wrist pain sp mechanical fall off of steps in her home. Of note the patient was on a long flight yesterday from [place]. The patient reports that she slipped and fell hitting her face and her hand went out to brace her fall, denies LOC but did hit her face and nose on the wall with subsequent nose bleed. Denies numbness/tingling. No other pain/injuries. Denies fevers/chills.     HEALTH ISSUES - PROBLEM Dx:    HLD    MEDICATIONS  (STANDING):    Allergies    No Known Allergies    Intolerances      Imaging: XR demonstrates L distal radius fracture                        14.2   8.4   )-----------( 237      ( 14 Mar 2019 04:27 )             41.5     14 Mar 2019 04:27    138    |  100    |  29     ----------------------------<  130    3.6     |  24     |  1.02     Ca    9.4        14 Mar 2019 04:27    TPro  6.9    /  Alb  3.8    /  TBili  0.3    /  DBili  x      /  AST  77     /  ALT  61     /  AlkPhos  61     14 Mar 2019 04:27    PT/INR - ( 14 Mar 2019 04:27 )   PT: 12.9 sec;   INR: 1.13 ratio         PTT - ( 14 Mar 2019 04:27 )  PTT:25.2 sec  Vital Signs Last 24 Hrs  T(C): 36.5 (03-14-19 @ 07:10), Max: 36.5 (03-14-19 @ 03:52)  T(F): 97.7 (03-14-19 @ 07:10), Max: 97.7 (03-14-19 @ 03:52)  HR: 86 (03-14-19 @ 07:10) (67 - 91)  BP: 106/70 (03-14-19 @ 07:10) (106/70 - 118/79)  BP(mean): --  RR: 17 (03-14-19 @ 07:10) (16 - 18)  SpO2: 99% (03-14-19 @ 07:10) (96% - 100%)    Physical Exam  Gen: NAD, awake and alert. No respiratory distress noted  Head: NCAT, no facial trauma  Neck: Intact AROM, no bony TTP.    LUE: Wrist swelling noted.  Skin intact, +TTP distal radius, no bony ttp elsewhere, compartments soft/compressive, extremity warm/well perfused. No elbow or shoulder tenderness or swelling. 2+ radial pulse, +AIN/PIN/M/U/R, SILT C5-T1  Secondary Survey: Dried blood around nares, C-collar in place, no midline tenderness on exam, right chest wall +TTP, Full ROM of unaffected extremities, Right thumb with no pain with PROM or AROm on exam; able to SLR B/L, SILT Globally, compartments soft, no bony TTP over other bony prominences, no calf TTP B/L, no TTP along axial spine.        Procedure: Verbal consent obtained for procedure from patient, 10 cc 1% lidocaine injected under sterile procedure into L Distal radius fracture site. Time was allowed to achieve anesthetic effect.  Closed reduction performed. Placed in well padded sugartong splint, molded appropriately. Post procedure imaging obtained. Post procedure exam unchanged, NV intact, able to wiggles all fingers, SILT distally.

## 2019-03-14 NOTE — PROVIDER CONTACT NOTE (CRITICAL VALUE NOTIFICATION) - ASSESSMENT
Pt A&OX4, VSS at this time. Pt without s/s of hypokalemia. Pt w/o s/s of chest pain, pressure, or palpitations.

## 2019-03-14 NOTE — CONSULT NOTE ADULT - ASSESSMENT
Patient is a 68y year old female with PMHx of HTN, HLD, migraine, osteopenia, anxiety, and lumbar spinal fusion c/b radicular symptoms and gait instability who presented to the ED after a mechanical fall down 5 step and striking her face against a wall.     Injuries:  1.

## 2019-03-14 NOTE — ED ADULT NURSE NOTE - CHPI ED NUR SYMPTOMS NEG
no confusion/no fever/no bleeding/no deformity/no weakness/no tingling/no vomiting/no loss of consciousness/no numbness

## 2019-03-14 NOTE — ED PROVIDER NOTE - CLINICAL SUMMARY MEDICAL DECISION MAKING FREE TEXT BOX
Michael Landrum DO: 67 yo F BIBEMS s/p mechanical fall down ~ 5 steps. + head trauma, no loc. pt with neck, chest abd pain, left wrist, right thumb pain. no palpitations numbness or weakness. left wrist grossly deformed. ABC immediately assessed and intact. left wrist grossly deformed and right mcp deform buy pt ranging with recurrent deformity in mcp extension. plan: cv monitor, symptom relief, tetanus, labs, ct, xr, reassess. Imaging results as follow acute displaced fx of R 5th/6th Ribs, Comminuted Impacted fx of the Left Distal Radius, Left seg/subseg PE, ulnar subluxation of the Proximal Phalanx of the Right Thumb at the MCP, ?nondisplaced nasal fracture, poss humerus endochondroma,  R non-obstructive nephrolithiasis, RLL Nodule, Thyroid Nodule. Trauma, ortho consulted. results discussed with son at bedside. he reports pt recently [place] from [place]. will discuss with trauma AC for PE. hand to be consulted for right thumb. admit

## 2019-03-15 ENCOUNTER — TRANSCRIPTION ENCOUNTER (OUTPATIENT)
Age: 69
End: 2019-03-15

## 2019-03-15 LAB
ANION GAP SERPL CALC-SCNC: 15 MMOL/L — SIGNIFICANT CHANGE UP (ref 5–17)
APTT BLD: 65.8 SEC — HIGH (ref 27.5–36.3)
APTT BLD: 76.5 SEC — HIGH (ref 27.5–36.3)
APTT BLD: 82.9 SEC — HIGH (ref 27.5–36.3)
BASOPHILS # BLD AUTO: 0.02 K/UL — SIGNIFICANT CHANGE UP (ref 0–0.2)
BASOPHILS NFR BLD AUTO: 0.2 % — SIGNIFICANT CHANGE UP (ref 0–2)
BUN SERPL-MCNC: 17 MG/DL — SIGNIFICANT CHANGE UP (ref 7–23)
CALCIUM SERPL-MCNC: 9.7 MG/DL — SIGNIFICANT CHANGE UP (ref 8.4–10.5)
CHLORIDE SERPL-SCNC: 106 MMOL/L — SIGNIFICANT CHANGE UP (ref 96–108)
CO2 SERPL-SCNC: 19 MMOL/L — LOW (ref 22–31)
CREAT SERPL-MCNC: 0.95 MG/DL — SIGNIFICANT CHANGE UP (ref 0.5–1.3)
EOSINOPHIL # BLD AUTO: 0.01 K/UL — SIGNIFICANT CHANGE UP (ref 0–0.5)
EOSINOPHIL NFR BLD AUTO: 0.1 % — SIGNIFICANT CHANGE UP (ref 0–6)
GLUCOSE SERPL-MCNC: 97 MG/DL — SIGNIFICANT CHANGE UP (ref 70–99)
HCT VFR BLD CALC: 37.4 % — SIGNIFICANT CHANGE UP (ref 34.5–45)
HCT VFR BLD CALC: 44.6 % — SIGNIFICANT CHANGE UP (ref 34.5–45)
HGB BLD-MCNC: 11.8 G/DL — SIGNIFICANT CHANGE UP (ref 11.5–15.5)
HGB BLD-MCNC: 14.5 G/DL — SIGNIFICANT CHANGE UP (ref 11.5–15.5)
IMM GRANULOCYTES NFR BLD AUTO: 0.6 % — SIGNIFICANT CHANGE UP (ref 0–1.5)
INR BLD: 1.27 RATIO — HIGH (ref 0.88–1.16)
LYMPHOCYTES # BLD AUTO: 1.07 K/UL — SIGNIFICANT CHANGE UP (ref 1–3.3)
LYMPHOCYTES # BLD AUTO: 12.5 % — LOW (ref 13–44)
MAGNESIUM SERPL-MCNC: 2 MG/DL — SIGNIFICANT CHANGE UP (ref 1.6–2.6)
MCHC RBC-ENTMCNC: 27.5 PG — SIGNIFICANT CHANGE UP (ref 27–34)
MCHC RBC-ENTMCNC: 28.9 PG — SIGNIFICANT CHANGE UP (ref 27–34)
MCHC RBC-ENTMCNC: 31.6 GM/DL — LOW (ref 32–36)
MCHC RBC-ENTMCNC: 32.6 GM/DL — SIGNIFICANT CHANGE UP (ref 32–36)
MCV RBC AUTO: 87.2 FL — SIGNIFICANT CHANGE UP (ref 80–100)
MCV RBC AUTO: 88.6 FL — SIGNIFICANT CHANGE UP (ref 80–100)
MONOCYTES # BLD AUTO: 0.69 K/UL — SIGNIFICANT CHANGE UP (ref 0–0.9)
MONOCYTES NFR BLD AUTO: 8.1 % — SIGNIFICANT CHANGE UP (ref 2–14)
NEUTROPHILS # BLD AUTO: 6.71 K/UL — SIGNIFICANT CHANGE UP (ref 1.8–7.4)
NEUTROPHILS NFR BLD AUTO: 78.5 % — HIGH (ref 43–77)
PHOSPHATE SERPL-MCNC: 2.7 MG/DL — SIGNIFICANT CHANGE UP (ref 2.5–4.5)
PLATELET # BLD AUTO: 194 K/UL — SIGNIFICANT CHANGE UP (ref 150–400)
PLATELET # BLD AUTO: 222 K/UL — SIGNIFICANT CHANGE UP (ref 150–400)
POTASSIUM SERPL-MCNC: 4.3 MMOL/L — SIGNIFICANT CHANGE UP (ref 3.5–5.3)
POTASSIUM SERPL-SCNC: 4.3 MMOL/L — SIGNIFICANT CHANGE UP (ref 3.5–5.3)
PROTHROM AB SERPL-ACNC: 14.7 SEC — HIGH (ref 10–12.9)
RBC # BLD: 4.29 M/UL — SIGNIFICANT CHANGE UP (ref 3.8–5.2)
RBC # BLD: 5.04 M/UL — SIGNIFICANT CHANGE UP (ref 3.8–5.2)
RBC # FLD: 13.3 % — SIGNIFICANT CHANGE UP (ref 10.3–14.5)
RBC # FLD: 13.8 % — SIGNIFICANT CHANGE UP (ref 10.3–14.5)
SODIUM SERPL-SCNC: 140 MMOL/L — SIGNIFICANT CHANGE UP (ref 135–145)
WBC # BLD: 6.8 K/UL — SIGNIFICANT CHANGE UP (ref 3.8–10.5)
WBC # BLD: 8.55 K/UL — SIGNIFICANT CHANGE UP (ref 3.8–10.5)
WBC # FLD AUTO: 6.8 K/UL — SIGNIFICANT CHANGE UP (ref 3.8–10.5)
WBC # FLD AUTO: 8.55 K/UL — SIGNIFICANT CHANGE UP (ref 3.8–10.5)

## 2019-03-15 PROCEDURE — 70450 CT HEAD/BRAIN W/O DYE: CPT | Mod: 26

## 2019-03-15 PROCEDURE — 73120 X-RAY EXAM OF HAND: CPT | Mod: 26,RT

## 2019-03-15 PROCEDURE — 93970 EXTREMITY STUDY: CPT | Mod: 26

## 2019-03-15 RX ADMIN — HYDROMORPHONE HYDROCHLORIDE 0.5 MILLIGRAM(S): 2 INJECTION INTRAMUSCULAR; INTRAVENOUS; SUBCUTANEOUS at 13:07

## 2019-03-15 RX ADMIN — Medication 100 MILLIGRAM(S): at 21:20

## 2019-03-15 RX ADMIN — LIDOCAINE 1 PATCH: 4 CREAM TOPICAL at 05:22

## 2019-03-15 RX ADMIN — Medication 150 MILLIGRAM(S): at 17:04

## 2019-03-15 RX ADMIN — Medication 100 MILLIGRAM(S): at 05:25

## 2019-03-15 RX ADMIN — HEPARIN SODIUM 1000 UNIT(S)/HR: 5000 INJECTION INTRAVENOUS; SUBCUTANEOUS at 03:17

## 2019-03-15 RX ADMIN — LIDOCAINE 1 PATCH: 4 CREAM TOPICAL at 14:21

## 2019-03-15 RX ADMIN — OXYCODONE AND ACETAMINOPHEN 2 TABLET(S): 5; 325 TABLET ORAL at 17:56

## 2019-03-15 RX ADMIN — Medication 40 MILLIEQUIVALENT(S): at 02:14

## 2019-03-15 RX ADMIN — Medication 62.5 MILLIMOLE(S): at 17:03

## 2019-03-15 RX ADMIN — HYDROMORPHONE HYDROCHLORIDE 0.5 MILLIGRAM(S): 2 INJECTION INTRAMUSCULAR; INTRAVENOUS; SUBCUTANEOUS at 13:22

## 2019-03-15 RX ADMIN — HEPARIN SODIUM 1000 UNIT(S)/HR: 5000 INJECTION INTRAVENOUS; SUBCUTANEOUS at 10:05

## 2019-03-15 RX ADMIN — OXYCODONE AND ACETAMINOPHEN 2 TABLET(S): 5; 325 TABLET ORAL at 10:10

## 2019-03-15 RX ADMIN — Medication 25 MILLIGRAM(S): at 05:25

## 2019-03-15 RX ADMIN — OXYCODONE AND ACETAMINOPHEN 2 TABLET(S): 5; 325 TABLET ORAL at 09:12

## 2019-03-15 RX ADMIN — LIDOCAINE 1 PATCH: 4 CREAM TOPICAL at 17:20

## 2019-03-15 RX ADMIN — OXYCODONE AND ACETAMINOPHEN 2 TABLET(S): 5; 325 TABLET ORAL at 17:03

## 2019-03-15 NOTE — DISCHARGE NOTE PROVIDER - NSDCFUADDINST_GEN_ALL_CORE_FT
While noted during your hospital course there were some other noted findings: It is pertinent that you follow up with these findings and discuss with your primary care physician:   1. Right Nephrolithiasis (Kidney stone) Discuss with your Primary Physician and consult urologist.   2. Endometrial wall Thickening (Discuss as well with your Gynecologist.)   3. Right lower lobe lung Nodule  4. Thyroid Nodule. (Have repeat Thyroid testing, and imaging, discuss with your PMD) Please follow up with Dr. Prado 7-10 days after your hospital discharge (call for appointment).  PT-non-weight bearing left upper extremity in splint, please keep it clean, dry and intact.  Your dose of Metoprolol has been decreased and a new script has been sent to the pharmacy, please discontinue your old dosing.  You are being treated with Xarelto for your DVT's.  You will take 15mg twice daily x 21 days, then please have either Dr. Prado or your PMD write you a script to change anticoagulation to 20mg daily.  This is to continue for at least 3 months, unless instructed otherwise by your PMD.  You should follow up with your PMD within 1 month of hospital discharge.  Please have doctor remove any sutures postop day 14 and apply steristrips (if applicable).    While noted during your hospital course there were some other noted findings: It is pertinent that you follow up with these findings and discuss with your primary care physician:   1. Right Nephrolithiasis (Kidney stone) Discuss with your Primary Physician and consult urologist.   2. Endometrial wall Thickening (Discuss as well with your Gynecologist.)   3. Right lower lobe lung Nodule  4. Thyroid Nodule. (Have repeat Thyroid testing, and imaging, discuss with your PMD)

## 2019-03-15 NOTE — CHART NOTE - NSCHARTNOTEFT_GEN_A_CORE
Team reached out to Dr. Matta ([phone]) regarding the patient's R thumb subluxation and nasal bone fractures. He had examined the patient yesterday following reduction by the ED of her thumb subluxation, and no further imaging had been performed at that time. Patient's nasal bone fracture with no urgent indication for operative repair. Formal consult to be dictated by Dr. Matta.  --IMTIAZ Pedro, PGY-4

## 2019-03-15 NOTE — PROGRESS NOTE ADULT - ASSESSMENT
Assessment:   68y Female who presents with Other pulmonary embolism without acute cor pulmonale    Plan:  -repeat CT head  -heparin gtt for subsegmental PE  -f/u with plastic surgery regarding nasal bone fractures and thumb subluxation   -f/u ortho for distal radius fracture  -DVT PPX  -Pain control   -OOB as tolerated with assistance   -Diet as tolerated  -Await Gi Lesley Falk   #[id number] 03-15-19 @ 08:08 Assessment:   68y Female who presents with Other pulmonary embolism without acute cor pulmonale    Plan:  -repeat CT head  -heparin gtt for subsegmental PE  -f/u with plastic surgery regarding nasal bone fractures and thumb subluxation   -f/u ortho for distal radius fracture  - PTOT  -DVT PPX  -Pain control   -OOB as tolerated with assistance   -Diet as tolerated        Lanny Falk   #[id number] 03-15-19 @ 08:08

## 2019-03-15 NOTE — DISCHARGE NOTE PROVIDER - HOSPITAL COURSE
EVENTS AS OF 3/14/19        Patient is a 68y year old female with PMHx of HTN, HLD, migraine, osteopenia, anxiety, and lumbar spinal fusion c/b radicular symptoms and gait instability who presented to the ED after a mechanical fall down 5 step and striking her face against a wall.         List of Injuries to date:    1. Nondisplaced Anterior Nasal Spine Fracture    2. Acute Displaced Fractures of R 5th/6th Ribs    3. Ulnar Subluxation of the Proximal Phalanx of the Right Thumb at the MCP    4. Acute Comminuted Impacted Fracture of the Left Distal Radius        Other Findings:    1. R Nephrolithiasis    2. Endometrial Thickening    3. RLL Nodule    4. Thyroid Nodule         Plan:    - Heparin gtt to PE    - BLE US to r/o DVT    - Ortho plan to take to OR for fixation, will discuss anticoagulation plan.  No contraindication to orthopedic sx plans for the OR, cleared from trauma sx team's perspective.      - Plastics to will evaluate for nasal bone fracture and thumb subluxation    - Regular diet; NPO @ [facility] for OR    - Multimodal pain control    - Incentive spirometer        EVENTS AS OF 3/15/2019    -Contacted by orthopedics team that they will not be taking the patient to the OR. The wrist was placed into a stent, and primary team was told the patient can follow up with Dr. Anthony Prado.     - No note left by PRS as of 3/15. Primary team will contact PRS team to fix thumb subluxation and nasal bone fracture.     - Patient will need outpatient follow up for RLL nodule and thyroid nodule EVENTS AS OF 3/14/19        Patient is a 68y year old female with PMHx of HTN, HLD, migraine, osteopenia, anxiety, and lumbar spinal fusion c/b radicular symptoms and gait instability who presented to the ED after a mechanical fall down 5 step and striking her face against a wall.         List of Injuries to date:    1. Nondisplaced Anterior Nasal Spine Fracture    2. Acute Displaced Fractures of R 5th/6th Ribs    3. Ulnar Subluxation of the Proximal Phalanx of the Right Thumb at the MCP    4. Acute Comminuted Impacted Fracture of the Left Distal Radius        Other Findings:    1. R Nephrolithiasis    2. Endometrial Thickening    3. RLL Nodule    4. Thyroid Nodule         Plan:    - Heparin gtt to PE    - BLE US to r/o DVT    - Ortho plan to take to OR for fixation, will discuss anticoagulation plan.  No contraindication to orthopedic sx plans for the OR, cleared from trauma sx team's perspective.      - Plastics to will evaluate for nasal bone fracture and thumb subluxation    - Regular diet; NPO @ [facility] for OR    - Multimodal pain control    - Incentive spirometer        EVENTS AS OF 3/15/2019    -Contacted by orthopedics team that they will not be taking the patient to the OR. The wrist was placed into a stent, and primary team was told the patient can follow up with Dr. Anthony Prado.     - No note left by PRS as of 3/15. Primary team will contact PRS team to fix thumb subluxation and nasal bone fracture.     - Patient will need outpatient follow up for RLL nodule and thyroid nodule    - Primary team will obtain CT head. This is a 68 year old female with PMHx s/o HTN, HLD, migraine, osteopenia, anxiety, and lumbar spinal fusion admitted to [facility] on 3/14/19 after a mechanical fall down stairs.  She was evaluated by Trauma Surgery, and found to have a L distal radius fracture and L 4th/5th rib fractures.  Patient also found to have a non-displaced nasal fracture.  Cleared by Trauma Surgery after her evaluation.  Patient evaluated by Medicine and cleared for operative fixation of her L distal radius fracture.  Patient was found to have DVT's on evaluation and non specific Endometrial thickening.  She was started on Heparin drip preop for the DVT's and Xarelto postop.  On 3/19, patient underwent an uncomplicated ORIF of the L Distal Radius.  Evaluated and treated by PT, recommended for home with outpatient PT.  Remain of hospital stay was unremarkable and patient discharged home.        < from: US Transvaginal (03.20.19 @ 15:47) >        IMPRESSION:        Normal endometrial thickness. Fluid is noted within the endometrial     canal, which corresponds to the low attenuation area noed on CT.        A 0.7 x 0.4x 0.5 cm polyp is seen within the cervix.        Small amount of free fluid in the pelvis.        < end of copied text >            < from: [facility] Duplex Lower Ext Vein Scan, Bilat (03.15.19 @ 10:22) >        Impression:        Acute above the knee DVT in the right lower extremity involving the     popliteal vein, tibioperoneal trunk, posterior tibial and peroneal veins.        Acute below the knee DVT in the left lower extremity confined to the     soleal vein.        < end of copied text >

## 2019-03-15 NOTE — CHART NOTE - NSCHARTNOTEFT_GEN_A_CORE
ACS SURGERY TERTIARY TRAUMA SURVEY  ------------------------------------------------------------------------------------    Date of TTS:   Time:   Admit Date:    Trauma Activation:   Admit GCS: E-     V-     M-     HPI:  Patient is a 68y year old female with PMHx of HTN, HLD, migraine, osteopenia, anxiety, and lumbar spinal fusion c/b radicular symptoms and gait instability who presented to the ED after a mechanical fall down 5 step and striking her face against a wall.     The patient states that she was walking down the stairs in the dark, when she missed a step, falling forward. She did not fall to the floor, but was unable to catch her balance, and struck her face against the wall at the bottom of the steps. The patient denies any LOC as a results of the accident. She was ambulatory immediately afterwards.     Upon presentation to the ED, the patient complained of left wrist pain, right chest wall pain, and nose pain. The patient denied any vision changed, headache, neck pain, back pain, abdominal pain, nausea, dizziness, or lightheadedness. (14 Mar 2019 12:32)      INTERVAL EVENTS: No acute interval events. The patient had a repeat CTH showing no acute intracranial hemorrhage. Feeling well and pain is improved from yesterday. She has been voiding without any issues and tolerating a regular diet. Tolerating heparin gtt and has been therapeutic.     PAST MEDICAL & SURGICAL HISTORY:  Low back pain potentially associated with radiculopathy  Osteopenia  Migraine  Hypertension  Anxiety  HLD (hyperlipidemia)  S/P spinal fusion  H/O laminectomy    [] No significant past history as reviewed with the patient and family    FAMILY HISTORY:    [] Family history not pertinent as reviewed with the patient and family    ALLERGIES: No Known Allergies      CURRENT MEDICATIONS  MEDICATIONS (STANDING): heparin  Infusion.  Unit(s)/Hr IV Continuous <Continuous>  metoprolol succinate ER 25 milliGRAM(s) Oral daily  topiramate 100 milliGRAM(s) Oral two times a day  venlafaxine XR. 150 milliGRAM(s) Oral daily    MEDICATIONS (PRN):acetaminophen   Tablet .. 325 milliGRAM(s) Oral every 4 hours PRN Mild Pain (1 - 3)  heparin  Injectable 2500 Unit(s) IV Push every 6 hours PRN For aPTT between 40 - 57  heparin  Injectable 5500 Unit(s) IV Push every 6 hours PRN For aPTT less than 40  HYDROmorphone  Injectable 0.5 milliGRAM(s) IV Push every 4 hours PRN break through pain  oxyCODONE    5 mG/acetaminophen 325 mG 1 Tablet(s) Oral every 4 hours PRN Moderate Pain (4 - 6)  oxyCODONE    5 mG/acetaminophen 325 mG 2 Tablet(s) Oral every 6 hours PRN Severe Pain (7 - 10)    -----------------------------------------------------------------------------------    VITAL SIGNS:  T(C): 36.8 (03-15-19 @ 21:00), Max: 36.9 (03-15-19 @ 18:06)  HR: 72 (03-15-19 @ 21:00) (65 - 73)  BP: 93/64 (03-15-19 @ 21:00) (93/63 - 110/70)  RR: 18 (03-15-19 @ 21:00) (18 - 19)  SpO2: 95% (03-15-19 @ 21:00) (95% - 97%)    Drug Dosing Weight  Height (cm): 162.56 (14 Mar 2019 03:52)  Weight (kg): 68 (14 Mar 2019 11:00)  BMI (kg/m2): 25.7 (14 Mar 2019 11:00)  BSA (m2): 1.73 (14 Mar 2019 11:00)    03-14 @ 07:01  -  03-15 @ 07:00  --------------------------------------------------------  IN:    heparin  Infusion.: 120 mL  Total IN: 120 mL    OUT:    Voided: 800 mL  Total OUT: 800 mL    Total NET: -680 mL      03-15 @ 07:01  -  03-15 @ 22:57  --------------------------------------------------------  IN:    heparin  Infusion.: 132 mL    Oral Fluid: 410 mL  Total IN: 542 mL    OUT:  Total OUT: 0 mL    Total NET: 542 mL    PHYSICAL EXAM:    General: NAD, Laying in bed comfortably, very conversive.  HEENT: EOMI. Appreciated finger rub bilaterally.   Neck: Soft, supple, full ROM. No cervical or paraspinal tenderness. No obvious stepoffs.   Cardio: Pulse regularly present.  Resp: Good effort, non-labored breathing.   Thorax: No chest wall tenderness.  Back: No thoracic or lumbar tenderness to palpation, no obvious stepoffs.   GI/Abd: Soft, nontender, nondistended.  Vascular: Extremities warm, bilateral radial pulses palpable, bilateral DP/PT palpable.  Skin: Intact, no breakdown.  Musculoskeletal: All 4 extremities moving spontaneously, no limitations. Full ROM of shoulders, elbows, wrists, fingers, knees, ankles bilaterally. No tenderness to palpation of joints or extremities.  Neuro: Strength 5/5 in all extremities bilaterally. Sensation to light touch intact in all extremities bilaterally.     LABS:  CBC (03-15 @ 06:32)                              14.5                           6.8     )----------------(  194        --    % Neutrophils, --    % Lymphocytes, ANC: --                                  44.6    CBC (03-15 @ 03:20)                              11.8                           8.55    )----------------(  222        78.5<H>% Neutrophils, 12.5<L>% Lymphocytes, ANC: 6.71                                37.4      BMP (03-15 @ 06:31)             140     |  106     |  17    		Ca++ --      Ca 9.7                ---------------------------------( 97    		Mg 2.0                4.3     |  19<L>   |  0.95  			Ph 2.7     BMP (03-14 @ 17:04)             138     |  100     |  21    		Ca++ --      Ca 8.8                ---------------------------------( 152<H>		Mg --                 2.8<LL>  |  25      |  0.82  			Ph --          Coags (03-15 @ 09:28)  aPTT 76.5<H> / INR -- / PT --  Coags (03-15 @ 06:33)  aPTT 65.8<H> / INR 1.27<H> / PT 14.7<H>      MICROBIOLOGY:  Urinalysis (03-14 @ 07:26):     Color: Light Yellow / Appearance: Clear / SG: >1.050<!> / pH: 7.0 / Gluc: Negative / Ketones: Negative / Bili: Negative / Urobili: Negative / Protein :Trace<!> / Nitrites: Negative / Leuk.Est: Negative / RBC: 5<H> / WBC: 1 / Sq Epi:  / Non Sq Epi: 2 / Bacteria Negative           ------------------------------------------------------------------------------------------  RADIOLOGICAL FINDINGS REVIEW:      Pelvis Films:    Extremity Films:   - Xray Forearm, Left (03.14.19 @ 08:54) comminuted fracture of the distal left   radius with volar angulation. The bony fragments are in adequate alignment. An overlying cast is in place.  Head CT: < from: CT Head No Cont (03.15.19 @ 12:22. No acute intracranial hemorrhage or mass effect. No displaced calvarial fracture  C-Spine CT: < from: CT Cervical Spine No Cont (03.14.19 @ 04:48) >    CT Head: No acute intracranial hemorrhage or calvarial fracture. Right frontal scalp soft tissue swelling.  CT maxillofacial: Question nondisplaced fracture of the anterior nasal spine. No additional maxillofacial bone fracture.  CT cervical spine: No acute cervical spine fracture or evidence of traumatic malalignment.  Rounded partially calcified lesion posterior and inferior to the left thyroid lobe. Further evaluation with nonemergent ultrasound is recommended.  Chest CT:  1.  Acute displaced fractures of the right fifth and sixth ribs.  No   focal infiltrate, pleural effusion or pneumothorax.  2.  Small segmental and subsegmental pulmonary emboli are seen in the   medial basal and anterior basal segments of the left lower lobe.  3.  Right lower lobe nodular opacity measures 5.5 x 4 mm.  Based on 2017   Fleischner Society criteria this does not necessitate any additional   follow-up imaging.  However, if the patient is a smoker or has other risk   factors for lung cancer an optional follow-up chest CT scan can be   considered in twelve months to exclude lesion growth.  4.  Heterogeneous thyroid nodules measuring up to 2.5 cm.  Outpatient   thyroid ultrasound is recommended for further characterization.  5.  Bone lesion in the proximal right humerus probably represents an   enchondroma.  Dedicated humerus radiographs can be obtained for further   characterization.    ABDOMEN/PELVIS:  1.  Comminuted displaced fracture of the distal left radius is   incidentally noted.  Otherwise there is no evidence of acute traumatic   injury in the abdomen or pelvis.  2.  Endometrium measures approximately 7 mm which is thickened for the   patient's age.  Vaginal bleeding should be excluded clinically.  A 1.8 x   1.1 cm lesion along the left side of the uterine fundus appears separate   from the left ovary may reflect a pedunculated, subserosal fibroid.    Pelvic ultrasound is recommended for further characterization.  3. 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  Median arcuate ligament syndrome should be excluded   clinically.  4.  Punctate nonobstructing right lower pole renal stones.    VA Duplex Lower Ext Vein Scan, Bilat 3/15 In the right leg, the common femoral and femoral veins are patent and compressible. There is occlusive thrombus in the popliteal vein, tibioperoneal trunk, posterior tibial vein and peroneal vein. Absence of compression and venous flow is noted here.    In the left leg, there is occlusive thrombus in the soleal vein of the calf. Absence of compression and venous flow is noted here. No propagation to the popliteal vein or veins of the thigh is noted.    List Injuries Identified to Date:    Left distal radius fracture s/p reduction  Right thumb subluxation  Multiple fractures of ribs  Incidental findings:   - subsegmental pulmonary thromboembolism  - dc hep gtt? Start a NoAC?  - thyroid and lung nodules  - enlarged endometrial cancer      Consults (Date):  [] Neurosurgery   [x] Orthopedic Surgery      INTERPRETATION/ASSESSMENT: 68F with PMHx of HTN, HLD, migraine, osteopenia, anxiety, and lumbar spinal fusion c/b radicular symptoms and gait instability who presented to the ED after a mechanical fall down 5 step and striking her face against a wall.     Plan:  - No contraindication to feeding regular diet  - Heparin gtt to PE, plan to transition to a NOAC if possible  - Duplex study has been formally red  - Ortho: plan to take to OR for fixation on 3/19  - Plastics will evaluate for nasal bone fracture and thumb subluxation  - Multimodal pain control  - Incentive spirometer    ACS Surgery 1836 ACS SURGERY TERTIARY TRAUMA SURVEY  ------------------------------------------------------------------------------------    Date of TTS:   Time:   Admit Date:    Trauma Activation:   Admit GCS: E-4     V-5     M-6     HPI:  Patient is a 68y year old female with PMHx of HTN, HLD, migraine, osteopenia, anxiety, and lumbar spinal fusion c/b radicular symptoms and gait instability who presented to the ED after a mechanical fall down 5 step and striking her face against a wall.     The patient states that she was walking down the stairs in the dark, when she missed a step, falling forward. She did not fall to the floor, but was unable to catch her balance, and struck her face against the wall at the bottom of the steps. The patient denies any LOC as a results of the accident. She was ambulatory immediately afterwards.     Upon presentation to the ED, the patient complained of left wrist pain, right chest wall pain, and nose pain. The patient denied any vision changed, headache, neck pain, back pain, abdominal pain, nausea, dizziness, or lightheadedness. (14 Mar 2019 12:32)      INTERVAL EVENTS: No acute interval events. The patient had a repeat CTH showing no acute intracranial hemorrhage. Feeling well and pain is improved from yesterday. She has been voiding without any issues and tolerating a regular diet. Tolerating heparin gtt and has been therapeutic.     PAST MEDICAL & SURGICAL HISTORY:  Low back pain potentially associated with radiculopathy  Osteopenia  Migraine  Hypertension  Anxiety  HLD (hyperlipidemia)  S/P spinal fusion  H/O laminectomy    [] No significant past history as reviewed with the patient and family    FAMILY HISTORY:    [] Family history not pertinent as reviewed with the patient and family    ALLERGIES: No Known Allergies      CURRENT MEDICATIONS  MEDICATIONS (STANDING): heparin  Infusion.  Unit(s)/Hr IV Continuous <Continuous>  metoprolol succinate ER 25 milliGRAM(s) Oral daily  topiramate 100 milliGRAM(s) Oral two times a day  venlafaxine XR. 150 milliGRAM(s) Oral daily    MEDICATIONS (PRN):acetaminophen   Tablet .. 325 milliGRAM(s) Oral every 4 hours PRN Mild Pain (1 - 3)  heparin  Injectable 2500 Unit(s) IV Push every 6 hours PRN For aPTT between 40 - 57  heparin  Injectable 5500 Unit(s) IV Push every 6 hours PRN For aPTT less than 40  HYDROmorphone  Injectable 0.5 milliGRAM(s) IV Push every 4 hours PRN break through pain  oxyCODONE    5 mG/acetaminophen 325 mG 1 Tablet(s) Oral every 4 hours PRN Moderate Pain (4 - 6)  oxyCODONE    5 mG/acetaminophen 325 mG 2 Tablet(s) Oral every 6 hours PRN Severe Pain (7 - 10)    -----------------------------------------------------------------------------------    VITAL SIGNS:  T(C): 36.8 (03-15-19 @ 21:00), Max: 36.9 (03-15-19 @ 18:06)  HR: 72 (03-15-19 @ 21:00) (65 - 73)  BP: 93/64 (03-15-19 @ 21:00) (93/63 - 110/70)  RR: 18 (03-15-19 @ 21:00) (18 - 19)  SpO2: 95% (03-15-19 @ 21:00) (95% - 97%)    Drug Dosing Weight  Height (cm): 162.56 (14 Mar 2019 03:52)  Weight (kg): 68 (14 Mar 2019 11:00)  BMI (kg/m2): 25.7 (14 Mar 2019 11:00)  BSA (m2): 1.73 (14 Mar 2019 11:00)    03-14 @ 07:01  -  03-15 @ 07:00  --------------------------------------------------------  IN:    heparin  Infusion.: 120 mL  Total IN: 120 mL    OUT:    Voided: 800 mL  Total OUT: 800 mL    Total NET: -680 mL      03-15 @ 07:01  -  03-15 @ 22:57  --------------------------------------------------------  IN:    heparin  Infusion.: 132 mL    Oral Fluid: 410 mL  Total IN: 542 mL    OUT:  Total OUT: 0 mL    Total NET: 542 mL    PHYSICAL EXAM:    General: NAD, Laying in bed comfortably, very conversive, family at the bedside  HEENT: EOMI. PERRL. Appreciated finger rub bilaterally. Ecchymosis over right eye and eyelid, the area is tender to palpation  Neck: Soft, supple, full ROM. No cervical or paraspinal tenderness. No obvious stepoffs.   Cardio: Pulse regular in right wrist  Resp: Good effort, non-labored breathing, able to draw between 500-600 cc of air on IS  Thorax: Tenderness of right chest/right ribs, no obvious swelling or ecchymosis  Back: No thoracic or lumbar tenderness to palpation, no obvious stepoffs.   GI/Abd: Soft, nontender, nondistended.  Vascular: Extremities warm, bilateral radial pulses palpable, bilateral DP/PT palpable.  Skin: Intact, no breakdown.  Musculoskeletal: All 4 extremities moving spontaneously. Limited ROM in left forearm and left hand due to splint and pain, full ROM of shoulders, bilaterally. Full ROM of knees and ankles bilaterally. No tenderness to palpation of joints or extremities. No peripheral edema    LABS:  CBC (03-15 @ 06:32)                              14.5                           6.8     )----------------(  194        --    % Neutrophils, --    % Lymphocytes, ANC: --                                  44.6    CBC (03-15 @ 03:20)                              11.8                           8.55    )----------------(  222        78.5<H>% Neutrophils, 12.5<L>% Lymphocytes, ANC: 6.71                                37.4      BMP (03-15 @ 06:31)             140     |  106     |  17    		Ca++ --      Ca 9.7                ---------------------------------( 97    		Mg 2.0                4.3     |  19<L>   |  0.95  			Ph 2.7     BMP (03-14 @ 17:04)             138     |  100     |  21    		Ca++ --      Ca 8.8                ---------------------------------( 152<H>		Mg --                 2.8<LL>  |  25      |  0.82  			Ph --          Coags (03-15 @ 09:28)  aPTT 76.5<H> / INR -- / PT --  Coags (03-15 @ 06:33)  aPTT 65.8<H> / INR 1.27<H> / PT 14.7<H>      MICROBIOLOGY:  Urinalysis (03-14 @ 07:26):     Color: Light Yellow / Appearance: Clear / SG: >1.050<!> / pH: 7.0 / Gluc: Negative / Ketones: Negative / Bili: Negative / Urobili: Negative / Protein :Trace<!> / Nitrites: Negative / Leuk.Est: Negative / RBC: 5<H> / WBC: 1 / Sq Epi:  / Non Sq Epi: 2 / Bacteria Negative           ------------------------------------------------------------------------------------------  RADIOLOGICAL FINDINGS REVIEW:      Pelvis Films:    Extremity Films:   - Xray Forearm, Left (03.14.19 @ 08:54) comminuted fracture of the distal left   radius with volar angulation. The bony fragments are in adequate alignment. An overlying cast is in place.  Head CT: < from: CT Head No Cont (03.15.19 @ 12:22. No acute intracranial hemorrhage or mass effect. No displaced calvarial fracture  C-Spine CT: < from: CT Cervical Spine No Cont (03.14.19 @ 04:48) >    CT Head: No acute intracranial hemorrhage or calvarial fracture. Right frontal scalp soft tissue swelling.  CT maxillofacial: Question nondisplaced fracture of the anterior nasal spine. No additional maxillofacial bone fracture.  CT cervical spine: No acute cervical spine fracture or evidence of traumatic malalignment.  Rounded partially calcified lesion posterior and inferior to the left thyroid lobe. Further evaluation with nonemergent ultrasound is recommended.  Chest CT:  1.  Acute displaced fractures of the right fifth and sixth ribs.  No   focal infiltrate, pleural effusion or pneumothorax.  2.  Small segmental and subsegmental pulmonary emboli are seen in the   medial basal and anterior basal segments of the left lower lobe.  3.  Right lower lobe nodular opacity measures 5.5 x 4 mm.  Based on 2017   Fleischner Society criteria this does not necessitate any additional   follow-up imaging.  However, if the patient is a smoker or has other risk   factors for lung cancer an optional follow-up chest CT scan can be   considered in twelve months to exclude lesion growth.  4.  Heterogeneous thyroid nodules measuring up to 2.5 cm.  Outpatient   thyroid ultrasound is recommended for further characterization.  5.  Bone lesion in the proximal right humerus probably represents an   enchondroma.  Dedicated humerus radiographs can be obtained for further   characterization.    ABDOMEN/PELVIS:  1.  Comminuted displaced fracture of the distal left radius is   incidentally noted.  Otherwise there is no evidence of acute traumatic   injury in the abdomen or pelvis.  2.  Endometrium measures approximately 7 mm which is thickened for the   patient's age.  Vaginal bleeding should be excluded clinically.  A 1.8 x   1.1 cm lesion along the left side of the uterine fundus appears separate   from the left ovary may reflect a pedunculated, subserosal fibroid.    Pelvic ultrasound is recommended for further characterization.  3. 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  Median arcuate ligament syndrome should be excluded   clinically.  4.  Punctate nonobstructing right lower pole renal stones.    VA Duplex Lower Ext Vein Scan, Bilat 3/15 In the right leg, the common femoral and femoral veins are patent and compressible. There is occlusive thrombus in the popliteal vein, tibioperoneal trunk, posterior tibial vein and peroneal vein. Absence of compression and venous flow is noted here.    In the left leg, there is occlusive thrombus in the soleal vein of the calf. Absence of compression and venous flow is noted here. No propagation to the popliteal vein or veins of the thigh is noted.    List Injuries Identified to Date:    Left distal radius fracture s/p reduction  Right thumb subluxation  Multiple fractures of ribs  Incidental findings:   - subsegmental pulmonary thromboembolism  - dc hep gtt? Start a NoAC?  - thyroid and lung nodules  - enlarged endometrial cancer      Consults (Date):  [] Neurosurgery   [x] Orthopedic Surgery      INTERPRETATION/ASSESSMENT: 68F with PMHx of HTN, HLD, migraine, osteopenia, anxiety, and lumbar spinal fusion c/b radicular symptoms and gait instability who presented to the ED after a mechanical fall down 5 step and striking her face against a wall.     Plan:  - No contraindication to feeding regular diet  - Heparin gtt to PE, plan to transition to a NOAC if possible  - Duplex study has been formally red  - Ortho: plan to take to OR for fixation on 3/19  - Plastics will evaluate for nasal bone fracture and thumb subluxation  - Multimodal pain control  - Incentive spirometer    ACS Surgery [id number]

## 2019-03-15 NOTE — DISCHARGE NOTE PROVIDER - NSDCCPCAREPLAN_GEN_ALL_CORE_FT
PRINCIPAL DISCHARGE DIAGNOSIS  Diagnosis: Distal radius fracture, left  Assessment and Plan of Treatment:       SECONDARY DISCHARGE DIAGNOSES  Diagnosis: Nose fracture  Assessment and Plan of Treatment:     Diagnosis: DVT (deep venous thrombosis)  Assessment and Plan of Treatment: DVT (deep venous thrombosis)    Diagnosis: Rib fractures  Assessment and Plan of Treatment:

## 2019-03-15 NOTE — PROGRESS NOTE ADULT - NSICDXPROBLEM_GEN_ALL_CORE_FT
PROBLEM DIAGNOSES  Problem: Fracture of wrist, left, closed, initial encounter  Recommendation: scheduled for Open reduction and internal fixation of wrist  No contraindication to scheduled procedure  pain meds as needed      Problem: Multiple fractures of upper extremity and ribs  Recommendation: continue to follow rib fxs   pain meds aas needed  encourage incentive spiromery     Problem: Pulmonary thromboembolism  Recommendation: PROBLEM DIAGNOSES  Problem: Fracture of wrist, left, closed, initial encounter  Assessment and Plan:  No contranidicaiton to surgery as planned.    Problem: Multiple fractures of upper extremity and ribs  Assessment and Plan: recommendation as per cardiology    Problem: Pulmonary thromboembolism  Assessment and Plan: recommendation as given by trauma surgery

## 2019-03-15 NOTE — PROGRESS NOTE ADULT - ASSESSMENT
A/P: 68y Female with L distal radius, closed.  Pain control  NWB LUE in splint, keep c/d/I,   Ice/elevation  Si/sx compartment syndrome discussed with patient, told to alert nurse if she develops symptoms  Patient will need operative fixation of left distal radius fracture  Plan for OR today 3/15 for OR   Please document clearance in chart  Plan to hold heparin drip 4 hours prior to OR with restart 4 hours post-op  NPO for OR tomorrow  IVF while NPO  Discussed with Dr. Prado who agrees with above

## 2019-03-15 NOTE — CHART NOTE - NSCHARTNOTEFT_GEN_A_CORE
- Orthopedics attending in touch with patient's primary care doctor  - Provider to RN placed to hold heparin ggt 4 hours before the OR as per request by orthopedics sx    ACS [id number]

## 2019-03-15 NOTE — PROGRESS NOTE ADULT - SUBJECTIVE AND OBJECTIVE BOX
GENERAL SURGERY DAILY PROGRESS NOTE:     Subjective: Patient seen and examined. Patient stable with no overnight events. Patient denies CP/ SOB/ Palpatations. Patient denies N/V. Operative intervention with orthopedics today.     MEDICATIONS  (STANDING):  heparin  Infusion.  Unit(s)/Hr (12 mL/Hr) IV Continuous <Continuous>  lidocaine   Patch 1 Patch Transdermal daily  metoprolol succinate ER 25 milliGRAM(s) Oral daily  topiramate 100 milliGRAM(s) Oral two times a day  venlafaxine XR. 150 milliGRAM(s) Oral daily    MEDICATIONS  (PRN):  acetaminophen   Tablet .. 325 milliGRAM(s) Oral every 4 hours PRN Mild Pain (1 - 3)  heparin  Injectable 2500 Unit(s) IV Push every 6 hours PRN For aPTT between 40 - 57  heparin  Injectable 5500 Unit(s) IV Push every 6 hours PRN For aPTT less than 40  HYDROmorphone  Injectable 0.5 milliGRAM(s) IV Push every 4 hours PRN break through pain  oxyCODONE    5 mG/acetaminophen 325 mG 1 Tablet(s) Oral every 4 hours PRN Moderate Pain (4 - 6)  oxyCODONE    5 mG/acetaminophen 325 mG 2 Tablet(s) Oral every 6 hours PRN Severe Pain (7 - 10)      Vital Signs Last 24 Hrs  T(C): 36.5 (15 Mar 2019 04:10), Max: 36.7 (14 Mar 2019 17:12)  T(F): 97.7 (15 Mar 2019 04:10), Max: 98.1 (14 Mar 2019 17:12)  HR: 65 (15 Mar 2019 04:10) (65 - 87)  BP: 104/68 (15 Mar 2019 04:10) (97/61 - 111/71)  RR: 18 (15 Mar 2019 04:10) (17 - 20)  SpO2: 97% (15 Mar 2019 04:10) (95% - 100%)    I&O's Detail  14 Mar 2019 07:01  -  15 Mar 2019 07:00  --------------------------------------------------------  IN:    heparin  Infusion.: 120 mL  Total IN: 120 mL    OUT:    Voided: 800 mL  Total OUT: 800 mL  Total NET: -680 mL      Daily     Daily Weight in k.9 (15 Mar 2019 04:10)    LABS:                        14.5   6.8   )-----------( 194      ( 15 Mar 2019 06:32 )             44.6     03-15    140  |  106  |  17  ----------------------------<  97  4.3   |  19<L>  |  0.95    Ca    9.7      15 Mar 2019 06:31    TPro  6.9  /  Alb  3.8  /  TBili  0.3  /  DBili  x   /  AST  77<H>  /  ALT  61<H>  /  AlkPhos  61  03-14  PT/INR - ( 15 Mar 2019 06:33 )   PT: 14.7 sec;   INR: 1.27 ratio    PTT - ( 15 Mar 2019 06:33 )  PTT:65.8 sec  Urinalysis Basic - ( 14 Mar 2019 07:26 )    Color: Light Yellow / Appearance: Clear / SG: >1.050 / pH: x  Gluc: x / Ketone: Negative  / Bili: Negative / Urobili: Negative   Blood: x / Protein: Trace / Nitrite: Negative   Leuk Esterase: Negative / RBC: 5 /hpf / WBC 1 /HPF   Sq Epi: x / Non Sq Epi: 2 /hpf / Bacteria: Negative      Physical Exam:   Gen: NAD, AAOx3  Abdomen: soft, NT, ND     Assessment:   68y Female who presents with Other pulmonary embolism without acute cor pulmonale    Plan:  -DVT PPX  -Pain control   -OOB as tolerated with assistance   -Diet as tolerated  -Await Jamee Falk   #[id number] 03-15-19 @ 08:08 GENERAL SURGERY DAILY PROGRESS NOTE:     Subjective: Patient seen and examined. Patient stable with no overnight events. Patient denies CP/ SOB/ Palpatations. Patient denies N/V. Operative intervention with orthopedics today.     MEDICATIONS  (STANDING):  heparin  Infusion.  Unit(s)/Hr (12 mL/Hr) IV Continuous <Continuous>  lidocaine   Patch 1 Patch Transdermal daily  metoprolol succinate ER 25 milliGRAM(s) Oral daily  topiramate 100 milliGRAM(s) Oral two times a day  venlafaxine XR. 150 milliGRAM(s) Oral daily    MEDICATIONS  (PRN):  acetaminophen   Tablet .. 325 milliGRAM(s) Oral every 4 hours PRN Mild Pain (1 - 3)  heparin  Injectable 2500 Unit(s) IV Push every 6 hours PRN For aPTT between 40 - 57  heparin  Injectable 5500 Unit(s) IV Push every 6 hours PRN For aPTT less than 40  HYDROmorphone  Injectable 0.5 milliGRAM(s) IV Push every 4 hours PRN break through pain  oxyCODONE    5 mG/acetaminophen 325 mG 1 Tablet(s) Oral every 4 hours PRN Moderate Pain (4 - 6)  oxyCODONE    5 mG/acetaminophen 325 mG 2 Tablet(s) Oral every 6 hours PRN Severe Pain (7 - 10)      Vital Signs Last 24 Hrs  T(C): 36.5 (15 Mar 2019 04:10), Max: 36.7 (14 Mar 2019 17:12)  T(F): 97.7 (15 Mar 2019 04:10), Max: 98.1 (14 Mar 2019 17:12)  HR: 65 (15 Mar 2019 04:10) (65 - 87)  BP: 104/68 (15 Mar 2019 04:10) (97/61 - 111/71)  RR: 18 (15 Mar 2019 04:10) (17 - 20)  SpO2: 97% (15 Mar 2019 04:10) (95% - 100%)    I&O's Detail  14 Mar 2019 07:01  -  15 Mar 2019 07:00  --------------------------------------------------------  IN:    heparin  Infusion.: 120 mL  Total IN: 120 mL    OUT:    Voided: 800 mL  Total OUT: 800 mL  Total NET: -680 mL      Daily     Daily Weight in k.9 (15 Mar 2019 04:10)    LABS:                        14.5   6.8   )-----------( 194      ( 15 Mar 2019 06:32 )             44.6     03-15    140  |  106  |  17  ----------------------------<  97  4.3   |  19<L>  |  0.95    Ca    9.7      15 Mar 2019 06:31    TPro  6.9  /  Alb  3.8  /  TBili  0.3  /  DBili  x   /  AST  77<H>  /  ALT  61<H>  /  AlkPhos  61  03-14  PT/INR - ( 15 Mar 2019 06:33 )   PT: 14.7 sec;   INR: 1.27 ratio    PTT - ( 15 Mar 2019 06:33 )  PTT:65.8 sec  Urinalysis Basic - ( 14 Mar 2019 07:26 )    Color: Light Yellow / Appearance: Clear / SG: >1.050 / pH: x  Gluc: x / Ketone: Negative  / Bili: Negative / Urobili: Negative   Blood: x / Protein: Trace / Nitrite: Negative   Leuk Esterase: Negative / RBC: 5 /hpf / WBC 1 /HPF   Sq Epi: x / Non Sq Epi: 2 /hpf / Bacteria: Negative      Physical Exam:   Gen: NAD, AAOx3  Abdomen: soft, NT, ND GENERAL SURGERY DAILY PROGRESS NOTE:     Subjective: Patient seen and examined. Patient stable with no overnight events. Patient denies CP/ SOB/ Palpatations. Patient denies N/V. Operative intervention with orthopedics today.     MEDICATIONS  (STANDING):  heparin  Infusion.  Unit(s)/Hr (12 mL/Hr) IV Continuous <Continuous>  lidocaine   Patch 1 Patch Transdermal daily  metoprolol succinate ER 25 milliGRAM(s) Oral daily  topiramate 100 milliGRAM(s) Oral two times a day  venlafaxine XR. 150 milliGRAM(s) Oral daily    MEDICATIONS  (PRN):  acetaminophen   Tablet .. 325 milliGRAM(s) Oral every 4 hours PRN Mild Pain (1 - 3)  heparin  Injectable 2500 Unit(s) IV Push every 6 hours PRN For aPTT between 40 - 57  heparin  Injectable 5500 Unit(s) IV Push every 6 hours PRN For aPTT less than 40  HYDROmorphone  Injectable 0.5 milliGRAM(s) IV Push every 4 hours PRN break through pain  oxyCODONE    5 mG/acetaminophen 325 mG 1 Tablet(s) Oral every 4 hours PRN Moderate Pain (4 - 6)  oxyCODONE    5 mG/acetaminophen 325 mG 2 Tablet(s) Oral every 6 hours PRN Severe Pain (7 - 10)      Vital Signs Last 24 Hrs  T(C): 36.5 (15 Mar 2019 04:10), Max: 36.7 (14 Mar 2019 17:12)  T(F): 97.7 (15 Mar 2019 04:10), Max: 98.1 (14 Mar 2019 17:12)  HR: 65 (15 Mar 2019 04:10) (65 - 87)  BP: 104/68 (15 Mar 2019 04:10) (97/61 - 111/71)  RR: 18 (15 Mar 2019 04:10) (17 - 20)  SpO2: 97% (15 Mar 2019 04:10) (95% - 100%)    I&O's Detail  14 Mar 2019 07:01  -  15 Mar 2019 07:00  --------------------------------------------------------  IN:    heparin  Infusion.: 120 mL  Total IN: 120 mL    OUT:    Voided: 800 mL  Total OUT: 800 mL  Total NET: -680 mL      Daily     Daily Weight in k.9 (15 Mar 2019 04:10)    LABS:                        14.5   6.8   )-----------( 194      ( 15 Mar 2019 06:32 )             44.6     03-15    140  |  106  |  17  ----------------------------<  97  4.3   |  19<L>  |  0.95    Ca    9.7      15 Mar 2019 06:31    TPro  6.9  /  Alb  3.8  /  TBili  0.3  /  DBili  x   /  AST  77<H>  /  ALT  61<H>  /  AlkPhos  61  03-14  PT/INR - ( 15 Mar 2019 06:33 )   PT: 14.7 sec;   INR: 1.27 ratio    PTT - ( 15 Mar 2019 06:33 )  PTT:65.8 sec  Urinalysis Basic - ( 14 Mar 2019 07:26 )    Color: Light Yellow / Appearance: Clear / SG: >1.050 / pH: x  Gluc: x / Ketone: Negative  / Bili: Negative / Urobili: Negative   Blood: x / Protein: Trace / Nitrite: Negative   Leuk Esterase: Negative / RBC: 5 /hpf / WBC 1 /HPF   Sq Epi: x / Non Sq Epi: 2 /hpf / Bacteria: Negative      Physical Exam:   Gen: NAD, AAOx3  Abdomen: soft, NT, ND   Ext: full strength in UEs bilaterally, tenderness over left wrist and hand

## 2019-03-15 NOTE — DISCHARGE NOTE PROVIDER - NSDCACTIVITY_GEN_ALL_CORE
Do not make important decisions/Showering allowed/Do not drive or operate machinery/No heavy lifting/straining Walking - Outdoors allowed/Do not make important decisions/Showering allowed/Stairs allowed/Walking - Indoors allowed/No heavy lifting/straining/Do not drive or operate machinery

## 2019-03-15 NOTE — PROGRESS NOTE ADULT - ASSESSMENT
67 yo woman with hx of fall at home found to have a left Wrist fx as well as rib fractures. On CT pt found to have subsegmental PEs    Patient is a 68y year old female with PMHx of HTN, HLD,  migraine, osteopenia, anxiety, and lumbar spinal fusion c/b radicular symptoms  and gait instability who presented to the ED after a mechanical fall down 5  step and striking her face against a wall at bottom of step. Pt admitted for  s/p fall. Pt injuries  are,Nondisplaced Anterior Nasal Spine Fracture, Acute  Displaced Fractures of R 5th/6th Ribs, Ulnar Subluxation of the Proximal  Phalanx of the Right Thumb at the MCP,. Acute Comminuted Impacted Fracture of  the Left Distal Radius.     Patient on anticoagulation due to DVT which will be managed by surgery ss the exact timing of OR uncertain.            Patient to go to surgery 3/15 or 3/16/19  Medically stable to proceed to OR as planned .

## 2019-03-15 NOTE — PROGRESS NOTE ADULT - SUBJECTIVE AND OBJECTIVE BOX
68 YOF not on ac s/p fall after slipping on step, pt fell down multiple steps <5 unsure pt was going down stairs in dark. Pt unable to get back up, has left wrist deformity. Pt complaining of neck pain, right sided chest pain, right upper quadrant abdominal pain, generalized back pain. Pt denies any LOC, denies fever, chills cough, no lightheadedness prior to fall, no chest pain prior to fall. Patient found to have a left wrist fx and rib fxs.  Pa;isiah scheduled for or late 3/15.    MEDICATIONS  (STANDING):  heparin  Infusion.  Unit(s)/Hr (12 mL/Hr) IV Continuous <Continuous>  lidocaine   Patch 1 Patch Transdermal daily  metoprolol succinate ER 25 milliGRAM(s) Oral daily  potassium chloride    Tablet ER 40 milliEquivalent(s) Oral every 4 hours  topiramate 100 milliGRAM(s) Oral two times a day  venlafaxine XR. 150 milliGRAM(s) Oral daily    MEDICATIONS  (PRN):  acetaminophen   Tablet .. 325 milliGRAM(s) Oral every 4 hours PRN Mild Pain (1 - 3)  heparin  Injectable 2500 Unit(s) IV Push every 6 hours PRN For aPTT between 40 - 57  heparin  Injectable 5500 Unit(s) IV Push every 6 hours PRN For aPTT less than 40  HYDROmorphone  Injectable 0.5 milliGRAM(s) IV Push every 4 hours PRN break through pain  oxyCODONE    5 mG/acetaminophen 325 mG 1 Tablet(s) Oral every 4 hours PRN Moderate Pain (4 - 6)  oxyCODONE    5 mG/acetaminophen 325 mG 2 Tablet(s) Oral every 6 hours PRN Severe Pain (7 - 10)          VITALS:   Vital Signs Last 24 Hrs  T(C): 36.6 (15 Mar 2019 13:00), Max: 36.7 (14 Mar 2019 17:12)  T(F): 97.8 (15 Mar 2019 13:00), Max: 98.1 (14 Mar 2019 17:12)  HR: 66 (15 Mar 2019 13:00) (65 - 74)  BP: 110/70 (15 Mar 2019 13:00) (97/61 - 111/71)  BP(mean): --  RR: 18 (15 Mar 2019 13:00) (18 - 20)  SpO2: 96% (15 Mar 2019 13:00) (95% - 97%)        Physical Exam  General: WN/WD NAD  Neurology: A&Ox3, nonfocal, [place] x 4  Eyes: PERRLA/ EOMI, Gross vision intact  ENT/Neck: Neck supple, trachea midline, No JVD, Gross hearing intact  Respiratory: decreased effort due to pain  CV: RRR, S1S2, no murmurs, rubs or gallops  Abdominal: Soft, NT, ND +BS,   Extremities: left wrist in splint  OR later today  Skin: No Rashes, Hematoma, Ecchymosis      LABS:    CBC Full  -  ( 15 Mar 2019 06:32 )  WBC Count : 6.8 K/uL  Hemoglobin : 14.5 g/dL  Hematocrit : 44.6 %  Platelet Count - Automated : 194 K/uL  Mean Cell Volume : 88.6 fl  Mean Cell Hemoglobin : 28.9 pg  Mean Cell Hemoglobin Concentration : 32.6 gm/dL  Auto Neutrophil # : x  Auto Lymphocyte # : x  Auto Monocyte # : x  Auto Eosinophil # : x  Auto Basophil # : x  Auto Neutrophil % : x  Auto Lymphocyte % : x  Auto Monocyte % : x  Auto Eosinophil % : x  Auto Basophil % : x    03-15    140  |  106  |  17  ----------------------------<  97  4.3   |  19<L>  |  0.95    Ca    9.7      15 Mar 2019 06:31  Phos  2.7     03-15  Mg     2.0     03-15    TPro  6.9  /  Alb  3.8  /  TBili  0.3  /  DBili  x   /  AST  77<H>  /  ALT  61<H>  /  AlkPhos  61  03-14    LIVER FUNCTIONS - ( 14 Mar 2019 04:27 )  Alb: 3.8 g/dL / Pro: 6.9 g/dL / ALK PHOS: 61 U/L / ALT: 61 U/L / AST: 77 U/L / GGT: x           PT/INR - ( 15 Mar 2019 06:33 )   PT: 14.7 sec;   INR: 1.27 ratio         PTT - ( 15 Mar 2019 09:28 )  PTT:76.5 sec  Urinalysis Basic - ( 14 Mar 2019 07:26 )    Color: Light Yellow / Appearance: Clear / SG: >1.050 / pH: x  Gluc: x / Ketone: Negative  / Bili: Negative / Urobili: Negative   Blood: x / Protein: Trace / Nitrite: Negative   Leuk Esterase: Negative / RBC: 5 /hpf / WBC 1 /HPF   Sq Epi: x / Non Sq Epi: 2 /hpf / Bacteria: Negative        CBC Full  -  ( 14 Mar 2019 18:04 )  WBC Count : 8.4 K/uL  Hemoglobin : 13.8 g/dL  Hematocrit : 41.9 %  Platelet Count - Automated : 219 K/uL  Mean Cell Volume : 86.1 fl  Mean Cell Hemoglobin : 28.3 pg  Mean Cell Hemoglobin Concentration : 32.8 gm/dL  Auto Neutrophil # : x  Auto Lymphocyte # : x  Auto Monocyte # : x  Auto Eosinophil # : x  Auto Basophil # : x  Auto Neutrophil % : x  Auto Lymphocyte % : x  Auto Monocyte % : x  Auto Eosinophil % : x  Auto Basophil % : x    03-14    138  |  100  |  21  ----------------------------<  152<H>  2.8<LL>   |  25  |  0.82    Ca    8.8      14 Mar 2019 17:04    TPro  6.9  /  Alb  3.8  /  TBili  0.3  /  DBili  x   /  AST  77<H>  /  ALT  61<H>  /  AlkPhos  61  03-14    LIVER FUNCTIONS - ( 14 Mar 2019 04:27 )  Alb: 3.8 g/dL / Pro: 6.9 g/dL / ALK PHOS: 61 U/L / ALT: 61 U/L / AST: 77 U/L / GGT: x           PT/INR - ( 14 Mar 2019 04:27 )   PT: 12.9 sec;   INR: 1.13 ratio         PTT - ( 14 Mar 2019 18:04 )  PTT:139.0 sec  Urinalysis Basic - ( 14 Mar 2019 07:26 )    Color: Light Yellow / Appearance: Clear / SG: >1.050 / pH: x  Gluc: x / Ketone: Negative  / Bili: Negative / Urobili: Negative   Blood: x / Protein: Trace / Nitrite: Negative   Leuk Esterase: Negative / RBC: 5 /hpf / WBC 1 /HPF   Sq Epi: x / Non Sq Epi: 2 /hpf / Bacteria: Negative    < from: CT Chest w/ IV Cont (03.14.19 @ 04:34) >    EXAM:  CT ABDOMEN AND PELVIS IC                          EXAM:  CT CHEST IC                            PROCEDURE DATE:  03/14/2019            INTERPRETATION:  CLINICAL INFORMATION: Status post fall down 5-6 steps.    Right-sided chest pain and right upper quadrant pain.    COMPARISON: None.    PROCEDURE:   CT of the Chest, Abdomen and Pelvis was performed with intravenous   contrast.   Imaging was performed through the chest in the arterial phase followed by   imaging of the abdomen and pelvis in the portal venous phase.  Intravenous contrast: 90 ml Omnipaque 350. 10 ml discarded.  Oral contrast:None.  Sagittal and coronal reformats were performed.    FINDINGS:    CHEST:     LUNGS AND LARGE AIRWAYS: Patent central airways.  Bibasilar subsegmental   atelectasis.  2.5 mm right upper lobe pulmonary nodule (2:31).  Nodular   opacity in the right lower lobe measures 5.5 x 4 mm (4:309).  PLEURA: No   pleural effusion or pneumothorax.  VESSELS: No acute traumatic aortic injury.  There are small segmental   subsegmental pulmonary emboli in the medial basal and anterior basal   segments of the left lower lobe.  HEART: Heart size is normal. No pericardial effusion.  MEDIASTINUM AND ALKA: No lymphadenopathy.  CHEST WALL AND LOWER NECK: Heterogeneous left thyroid nodules measuring   2.5 x 2.4 cm (2:11) and 2.3 x 1.8 cm (2:14).  Right thyroid nodule   measuring 1.4 x 1 cm (2:7).  BONES: Mildly displaced fractures of the lateral right fifth and sixth   ribs.  Approximately 4.5 x 2 cm lesion in the proximal right humerus   appears to contain chondroid matrix and probably represent an   enchondroma.      ABDOMEN AND PELVIS:         LIVER: A 4-5 mm hypoattenuating focus in segment 7 (3:28) is too small to   characterize by CT scan.  BILE DUCTS: Normal caliber.  GALLBLADDER: Within normal limits.  SPLEEN: Within normal limits.  PANCREAS: Within normal limits.  ADRENALS: Within normal limits.  KIDNEYS/URETERS: Kidneys enhance symmetrically without hydronephrosis.    There are two punctate nonobstructing right lower pole renal stones that   both measure less than 2 mm (4:565 and 4:568).  A 3 mm hypoattenuating   focus in the right kidney (3:40) is too small to characterize by CT scan.    BLADDER: Within normal limits.  REPRODUCTIVE ORGANS: Endometrium measures approximately 7 mm.  A 1.8 x   1.1 cm lesion along the left side of the uterine fundus appears separate   from the left ovary and may reflect a pedunculated, subserosal fibroid   (3:87).     BOWEL: No bowel obstruction.  Appendix not visualized; no secondary   signs of appendicitis.  No colonic diverticular disease.  PERITONEUM: No   hemoperitoneum, ascites or free air.  VESSELS:  There is a sort segment severe stenosis in the proximal celiac   trunk which appears due to compression from the median arcuate ligament.  RETROPERITONEUM: No retroperitoneal hemorrhage.  No lymphadenopathy.    ABDOMINAL WALL: Within normal limits.  BONES: Thoracolumbar scoliosis and multilevel degenerative changes in the   spine.  The patient is status post L4-S1 posterior spinal fusion with   left-sided pedicle screws at L4, L5 and S1.  There are also interbody   fusions at L4-L5 and L5-S1.  Spinal hardware appears intact.  There is   bony ankylosis of the facet joints at L4-L5 bilaterally and L5-S1 on the   left.  Bony pelvis is intact.  A comminuted impacted fracture of the   distal left radius is incidentally noted.    CRITICAL VALUE: Acute pulmonary embolism was reported to Dr. Palacios in the   emergency department on 03/14/2019 at 5:00 AM.  Critical value policy of   the hospital was followed. Read back and confirmation of receipt of this   communication was performed. This verbal communication supplements the   text report of this document.    IMPRESSION:        CHEST:  1.  Acute displaced fractures of the right fifth and sixth ribs.  No   focal infiltrate, pleural effusion or pneumothorax.  2.  Small segmental and subsegmental pulmonary emboli are seen in the   medial basal and anterior basal segments of the left lower lobe.  3.  Right lower lobe nodular opacity measures 5.5 x 4 mm.  Based on 2017   Fleischner Society criteria this does not necessitate any additional   follow-up imaging.  However, if the patient is a smoker or has other risk   factors for lung cancer an optional follow-up chest CT scan can be   considered in twelve months to exclude lesion growth.  4.  Heterogeneous thyroid nodules measuring up to 2.5 cm.  Outpatient   thyroid ultrasound is recommended for further characterization.  5.  Bone lesion in the proximal right humerus probably represents an   enchondroma.  Dedicated humerus radiographs can be obtained for further   characterization.    ABDOMEN/PELVIS:  1.  Comminuted displaced fracture of the distal left radius is   incidentally noted.  Otherwise there is no evidence of acute traumatic   injury in the abdomen or pelvis.  2.  Endometrium measures approximately 7 mm which is thickened for the   patient's age.  Vaginal bleeding should be excluded clinically.  A 1.8 x   1.1 cm lesion along the left side of the uterine fundus appears separate   from the left ovary may reflect a pedunculated, subserosal fibroid.    Pelvic ultrasound is recommended for further characterization.  3. 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  Median arcuate ligament syndrome should be excluded   clinically.  4.  Punctate nonobstructing right lower pole renal stones.

## 2019-03-15 NOTE — DISCHARGE NOTE PROVIDER - NSDCCPTREATMENT_GEN_ALL_CORE_FT
PRINCIPAL PROCEDURE  Procedure: Open reduction and internal fixation (ORIF) of extra-articular fracture of distal radius  Findings and Treatment:

## 2019-03-15 NOTE — DISCHARGE NOTE PROVIDER - CARE PROVIDER_API CALL
Anthony Prado)  Orthopedics  [address], [address]  [place] [address]  Phone: [phone]  Fax: [phone]  Follow Up Time:

## 2019-03-15 NOTE — PROGRESS NOTE ADULT - SUBJECTIVE AND OBJECTIVE BOX
Pt S/E at bedside, no acute events overnight, pain controlled, denies SOB or CP.     Vital Signs Last 24 Hrs  T(C): 36.5 (15 Mar 2019 04:10), Max: 36.7 (14 Mar 2019 17:12)  T(F): 97.7 (15 Mar 2019 04:10), Max: 98.1 (14 Mar 2019 17:12)  HR: 65 (15 Mar 2019 04:10) (65 - 87)  BP: 104/68 (15 Mar 2019 04:10) (97/61 - 111/71)  BP(mean): --  RR: 18 (15 Mar 2019 04:10) (17 - 20)  SpO2: 97% (15 Mar 2019 04:10) (95% - 100%)    Gen: NAD,     Left Upper Extremity:  Dressing clean dry intact  +AIN/PIN/M/R/U/Msc/Ax  SILT C5-T1  +Radial Pulse  Compartments soft  No calf TTP B/L

## 2019-03-15 NOTE — DISCHARGE NOTE PROVIDER - CARE PROVIDERS DIRECT ADDRESSES
[email or web address]

## 2019-03-16 LAB
ANION GAP SERPL CALC-SCNC: 13 MMOL/L — SIGNIFICANT CHANGE UP (ref 5–17)
APTT BLD: 68.6 SEC — HIGH (ref 27.5–36.3)
BUN SERPL-MCNC: 19 MG/DL — SIGNIFICANT CHANGE UP (ref 7–23)
CALCIUM SERPL-MCNC: 8.7 MG/DL — SIGNIFICANT CHANGE UP (ref 8.4–10.5)
CHLORIDE SERPL-SCNC: 105 MMOL/L — SIGNIFICANT CHANGE UP (ref 96–108)
CO2 SERPL-SCNC: 19 MMOL/L — LOW (ref 22–31)
CREAT SERPL-MCNC: 0.8 MG/DL — SIGNIFICANT CHANGE UP (ref 0.5–1.3)
GLUCOSE SERPL-MCNC: 99 MG/DL — SIGNIFICANT CHANGE UP (ref 70–99)
HCT VFR BLD CALC: 39.7 % — SIGNIFICANT CHANGE UP (ref 34.5–45)
HGB BLD-MCNC: 13.3 G/DL — SIGNIFICANT CHANGE UP (ref 11.5–15.5)
INR BLD: 1.28 RATIO — HIGH (ref 0.88–1.16)
MAGNESIUM SERPL-MCNC: 1.8 MG/DL — SIGNIFICANT CHANGE UP (ref 1.6–2.6)
MCHC RBC-ENTMCNC: 29.1 PG — SIGNIFICANT CHANGE UP (ref 27–34)
MCHC RBC-ENTMCNC: 33.6 GM/DL — SIGNIFICANT CHANGE UP (ref 32–36)
MCV RBC AUTO: 86.8 FL — SIGNIFICANT CHANGE UP (ref 80–100)
PHOSPHATE SERPL-MCNC: 2.4 MG/DL — LOW (ref 2.5–4.5)
PLATELET # BLD AUTO: 214 K/UL — SIGNIFICANT CHANGE UP (ref 150–400)
POTASSIUM SERPL-MCNC: 4.2 MMOL/L — SIGNIFICANT CHANGE UP (ref 3.5–5.3)
POTASSIUM SERPL-SCNC: 4.2 MMOL/L — SIGNIFICANT CHANGE UP (ref 3.5–5.3)
PROTHROM AB SERPL-ACNC: 14.7 SEC — HIGH (ref 10–12.9)
RBC # BLD: 4.57 M/UL — SIGNIFICANT CHANGE UP (ref 3.8–5.2)
RBC # FLD: 13.1 % — SIGNIFICANT CHANGE UP (ref 10.3–14.5)
SODIUM SERPL-SCNC: 137 MMOL/L — SIGNIFICANT CHANGE UP (ref 135–145)
WBC # BLD: 7.6 K/UL — SIGNIFICANT CHANGE UP (ref 3.8–10.5)
WBC # FLD AUTO: 7.6 K/UL — SIGNIFICANT CHANGE UP (ref 3.8–10.5)

## 2019-03-16 RX ORDER — MAGNESIUM SULFATE 500 MG/ML
2 VIAL (ML) INJECTION ONCE
Qty: 0 | Refills: 0 | Status: COMPLETED | OUTPATIENT
Start: 2019-03-16 | End: 2019-03-16

## 2019-03-16 RX ADMIN — Medication 25 MILLIGRAM(S): at 05:56

## 2019-03-16 RX ADMIN — LIDOCAINE 1 PATCH: 4 CREAM TOPICAL at 02:31

## 2019-03-16 RX ADMIN — HEPARIN SODIUM 1000 UNIT(S)/HR: 5000 INJECTION INTRAVENOUS; SUBCUTANEOUS at 08:11

## 2019-03-16 RX ADMIN — Medication 100 MILLIGRAM(S): at 05:56

## 2019-03-16 RX ADMIN — LIDOCAINE 1 PATCH: 4 CREAM TOPICAL at 23:40

## 2019-03-16 RX ADMIN — LIDOCAINE 1 PATCH: 4 CREAM TOPICAL at 17:05

## 2019-03-16 RX ADMIN — OXYCODONE AND ACETAMINOPHEN 2 TABLET(S): 5; 325 TABLET ORAL at 06:45

## 2019-03-16 RX ADMIN — LIDOCAINE 1 PATCH: 4 CREAM TOPICAL at 08:11

## 2019-03-16 RX ADMIN — OXYCODONE AND ACETAMINOPHEN 2 TABLET(S): 5; 325 TABLET ORAL at 06:12

## 2019-03-16 RX ADMIN — Medication 62.5 MILLIMOLE(S): at 13:08

## 2019-03-16 RX ADMIN — OXYCODONE AND ACETAMINOPHEN 2 TABLET(S): 5; 325 TABLET ORAL at 15:50

## 2019-03-16 RX ADMIN — Medication 50 GRAM(S): at 10:53

## 2019-03-16 RX ADMIN — OXYCODONE AND ACETAMINOPHEN 2 TABLET(S): 5; 325 TABLET ORAL at 15:21

## 2019-03-16 RX ADMIN — Medication 100 MILLIGRAM(S): at 17:05

## 2019-03-16 RX ADMIN — Medication 150 MILLIGRAM(S): at 08:12

## 2019-03-16 NOTE — PHYSICAL THERAPY INITIAL EVALUATION ADULT - STANDING BALANCE: STATIC
Patient is a 52y old  Female who presents with a chief complaint of SOB (13 Dec 2017 16:01)  Awake, alert, comfortable in bed in NAD. Still awaiting Indium scan. S/p PC Placement    INTERVAL HPI/OVERNIGHT EVENTS:      VITAL SIGNS:  T(F): 98.7 (01-22-18 @ 05:10)  HR: 77 (01-22-18 @ 05:10)  BP: 130/73 (01-22-18 @ 05:10)  RR: 17 (01-22-18 @ 05:10)  SpO2: 100% (01-22-18 @ 05:10)  Wt(kg): --  I&O's Detail          REVIEW OF SYSTEMS:    CONSTITUTIONAL:  No fevers, chills, sweats    HEENT:  Eyes:  No diplopia or blurred vision. ENT:  No earache, sore throat or runny nose.    CARDIOVASCULAR:  No pressure, squeezing, tightness, or heaviness about the chest; no palpitations.    RESPIRATORY:  Per HPI    GASTROINTESTINAL:  No abdominal pain, nausea, vomiting or diarrhea.    GENITOURINARY:  No dysuria, frequency or urgency.    NEUROLOGIC:  No paresthesias, fasciculations, seizures or weakness.    PSYCHIATRIC:  No disorder of thought or mood.      PHYSICAL EXAM:    Constitutional: Well developed and nourished  Eyes:Perrla  ENMT: normal  Neck:supple  Respiratory: good air entry  Cardiovascular: S1 S2 regular  Gastrointestinal: Soft, Non tender  Extremities: No edema  Vascular:normal  Neurological:Awake, alert,Ox3  Musculoskeletal:Normal      MEDICATIONS  (STANDING):  ascorbic acid 500 milliGRAM(s) Oral daily  calamine Lotion 1 Application(s) Topical three times a day  cinacalcet 30 milliGRAM(s) Oral daily  dextrose 5%. 1000 milliLiter(s) (50 mL/Hr) IV Continuous <Continuous>  dextrose 50% Injectable 12.5 Gram(s) IV Push once  dextrose 50% Injectable 25 Gram(s) IV Push once  dextrose 50% Injectable 25 Gram(s) IV Push once  docusate sodium 100 milliGRAM(s) Oral two times a day  epoetin jacqueline Injectable 11998 Unit(s) IV Push <User Schedule>  ferrous    sulfate Liquid 300 milliGRAM(s) Enteral Tube daily  folic acid 1 milliGRAM(s) Oral daily  gabapentin 100 milliGRAM(s) Oral three times a day  heparin  Injectable 5000 Unit(s) SubCutaneous every 12 hours  hydrALAZINE 50 milliGRAM(s) Oral three times a day  insulin lispro (HumaLOG) corrective regimen sliding scale   SubCutaneous Before meals and at bedtime  levothyroxine 50 MICROGram(s) Oral daily  meropenem IVPB 1000 milliGRAM(s) IV Intermittent every 24 hours  meropenem IVPB      metoprolol     tartrate 25 milliGRAM(s) Oral two times a day  nitroglycerin    Patch 0.2 mG/Hr(s) 1 patch Transdermal daily  pantoprazole  Injectable 40 milliGRAM(s) IV Push every 12 hours  PPD  5 Tuberculin Unit(s) Injectable 5 Unit(s) IntraDermal once  senna 2 Tablet(s) Oral at bedtime  sevelamer hydrochloride 1600 milliGRAM(s) Oral three times a day with meals  simvastatin 20 milliGRAM(s) Oral at bedtime    MEDICATIONS  (PRN):  acetaminophen   Tablet 650 milliGRAM(s) Oral every 6 hours PRN For Temp greater than 38 C (100.4 F)  acetaminophen   Tablet. 650 milliGRAM(s) Oral every 6 hours PRN Moderate Pain (4 - 6)  aluminum hydroxide/magnesium hydroxide/simethicone Suspension 30 milliLiter(s) Oral every 4 hours PRN Dyspepsia  dextrose Gel 1 Dose(s) Oral once PRN Blood Glucose LESS THAN 70 milliGRAM(s)/deciliter  dextrose Gel 1 Dose(s) Oral once PRN Blood Glucose LESS THAN 70 milliGRAM(s)/deciliter  glucagon  Injectable 1 milliGRAM(s) IntraMuscular once PRN Glucose LESS THAN 70 milligrams/deciliter      Allergies    No Known Allergies    Intolerances        LABS:                        8.5    13.3  )-----------( 359      ( 21 Jan 2018 20:16 )             30.2     01-21    138  |  103  |  29<H>  ----------------------------<  73  4.2   |  25  |  4.79<H>    Ca    7.7<L>      21 Jan 2018 07:08  Phos  3.6     01-21  Mg     2.8     01-21    TPro  5.3<L>  /  Alb  1.4<L>  /  TBili  0.3  /  DBili  x   /  AST  11  /  ALT  <6<L>  /  AlkPhos  102  01-21              CAPILLARY BLOOD GLUCOSE      POCT Blood Glucose.: 109 mg/dL (22 Jan 2018 11:03)  POCT Blood Glucose.: 97 mg/dL (22 Jan 2018 07:13)  POCT Blood Glucose.: 122 mg/dL (21 Jan 2018 21:21)  POCT Blood Glucose.: 99 mg/dL (21 Jan 2018 16:02)        RADIOLOGY & ADDITIONAL TESTS:    CXR:    Ct scan chest:    ekg;    echo: fair plus

## 2019-03-16 NOTE — PROGRESS NOTE ADULT - ASSESSMENT
69 yo woman with hx of fall at home found to have a left Wrist fx as well as rib fractures. On CT pt found to have subsegmental PEs    Patient is a 68y year old female with PMHx of HTN, HLD,  migraine, osteopenia, anxiety, and lumbar spinal fusion c/b radicular symptoms  and gait instability who presented to the ED after a mechanical fall down 5  step and striking her face against a wall at bottom of step. Pt admitted for  s/p fall. Pt injuries  are,Nondisplaced Anterior Nasal Spine Fracture, Acute  Displaced Fractures of R 5th/6th Ribs, Ulnar Subluxation of the Proximal  Phalanx of the Right Thumb at the MCP,. Acute Comminuted Impacted Fracture of  the Left Distal Radius.     Patient on anticoagulation due to DVT which will be managed by surgery ss the exact timing of OR uncertain.            Patient surgery rescheduled for 3/19  Medically stable to proceed to OR as planned .

## 2019-03-16 NOTE — PROGRESS NOTE ADULT - SUBJECTIVE AND OBJECTIVE BOX
68 YOF not on ac s/p fall after slipping on step, pt fell down multiple steps <5 unsure pt was going down stairs in dark. Pt unable to get back up, has left wrist deformity. Pt complaining of neck pain, right sided chest pain, right upper quadrant abdominal pain, generalized back pain. Pt denies any LOC, denies fever, chills cough, no lightheadedness prior to fall, no chest pain prior to fall. Patient found to have a left wrist fx and rib fxs.  Patient awaiting OR. moving fingers without     problems  Encouraged deep breathing exercises    MEDICATIONS  (STANDING):  heparin  Infusion.  Unit(s)/Hr (12 mL/Hr) IV Continuous <Continuous>  lidocaine   Patch 1 Patch Transdermal daily  metoprolol succinate ER 25 milliGRAM(s) Oral daily  potassium chloride    Tablet ER 40 milliEquivalent(s) Oral every 4 hours  topiramate 100 milliGRAM(s) Oral two times a day  venlafaxine XR. 150 milliGRAM(s) Oral daily    MEDICATIONS  (PRN):  acetaminophen   Tablet .. 325 milliGRAM(s) Oral every 4 hours PRN Mild Pain (1 - 3)  heparin  Injectable 2500 Unit(s) IV Push every 6 hours PRN For aPTT between 40 - 57  heparin  Injectable 5500 Unit(s) IV Push every 6 hours PRN For aPTT less than 40  HYDROmorphone  Injectable 0.5 milliGRAM(s) IV Push every 4 hours PRN break through pain  oxyCODONE    5 mG/acetaminophen 325 mG 1 Tablet(s) Oral every 4 hours PRN Moderate Pain (4 - 6)  oxyCODONE    5 mG/acetaminophen 325 mG 2 Tablet(s) Oral every 6 hours PRN Severe Pain (7 - 10)        Vital Signs Last 24 Hrs  T(C): 36.8 (16 Mar 2019 19:02), Max: 37 (16 Mar 2019 04:04)  T(F): 98.3 (16 Mar 2019 19:02), Max: 98.6 (16 Mar 2019 04:04)  HR: 79 (16 Mar 2019 19:02) (70 - 79)  BP: 99/61 (16 Mar 2019 19:02) (95/58 - 125/79)  BP(mean): --  RR: 18 (16 Mar 2019 19:02) (18 - 18)  SpO2: 94% (16 Mar 2019 19:02) (94% - 100%)        Physical Exam  General: WN/WD NAD  Neurology: A&Ox3, nonfocal, [place] x 4  Eyes: PERRLA/ EOMI, Gross vision intact  ENT/Neck: Neck supple, trachea midline, No JVD, Gross hearing intact  Respiratory: decreased effort due to pain  CV: RRR, S1S2, no murmurs, rubs or gallops  Abdominal: Soft, NT, ND +BS,   Extremities: left wrist in splint  OR rescheduled for 3/19  Skin: No Rashes, Hematoma, Ecchymosis      LABS:    CBC Full  -  ( 15 Mar 2019 06:32 )  WBC Count : 6.8 K/uL  Hemoglobin : 14.5 g/dL  Hematocrit : 44.6 %  Platelet Count - Automated : 194 K/uL  Mean Cell Volume : 88.6 fl  Mean Cell Hemoglobin : 28.9 pg  Mean Cell Hemoglobin Concentration : 32.6 gm/dL  Auto Neutrophil # : x  Auto Lymphocyte # : x  Auto Monocyte # : x  Auto Eosinophil # : x  Auto Basophil # : x  Auto Neutrophil % : x  Auto Lymphocyte % : x  Auto Monocyte % : x  Auto Eosinophil % : x  Auto Basophil % : x    03-15    140  |  106  |  17  ----------------------------<  97  4.3   |  19<L>  |  0.95    Ca    9.7      15 Mar 2019 06:31  Phos  2.7     03-15  Mg     2.0     03-15    TPro  6.9  /  Alb  3.8  /  TBili  0.3  /  DBili  x   /  AST  77<H>  /  ALT  61<H>  /  AlkPhos  61  03-14    LIVER FUNCTIONS - ( 14 Mar 2019 04:27 )  Alb: 3.8 g/dL / Pro: 6.9 g/dL / ALK PHOS: 61 U/L / ALT: 61 U/L / AST: 77 U/L / GGT: x           PT/INR - ( 15 Mar 2019 06:33 )   PT: 14.7 sec;   INR: 1.27 ratio         PTT - ( 15 Mar 2019 09:28 )  PTT:76.5 sec  Urinalysis Basic - ( 14 Mar 2019 07:26 )    Color: Light Yellow / Appearance: Clear / SG: >1.050 / pH: x  Gluc: x / Ketone: Negative  / Bili: Negative / Urobili: Negative   Blood: x / Protein: Trace / Nitrite: Negative   Leuk Esterase: Negative / RBC: 5 /hpf / WBC 1 /HPF   Sq Epi: x / Non Sq Epi: 2 /hpf / Bacteria: Negative        CBC Full  -  ( 14 Mar 2019 18:04 )  WBC Count : 8.4 K/uL  Hemoglobin : 13.8 g/dL  Hematocrit : 41.9 %  Platelet Count - Automated : 219 K/uL  Mean Cell Volume : 86.1 fl  Mean Cell Hemoglobin : 28.3 pg  Mean Cell Hemoglobin Concentration : 32.8 gm/dL  Auto Neutrophil # : x  Auto Lymphocyte # : x  Auto Monocyte # : x  Auto Eosinophil # : x  Auto Basophil # : x  Auto Neutrophil % : x  Auto Lymphocyte % : x  Auto Monocyte % : x  Auto Eosinophil % : x  Auto Basophil % : x    03-14    138  |  100  |  21  ----------------------------<  152<H>  2.8<LL>   |  25  |  0.82    Ca    8.8      14 Mar 2019 17:04    TPro  6.9  /  Alb  3.8  /  TBili  0.3  /  DBili  x   /  AST  77<H>  /  ALT  61<H>  /  AlkPhos  61  03-14    LIVER FUNCTIONS - ( 14 Mar 2019 04:27 )  Alb: 3.8 g/dL / Pro: 6.9 g/dL / ALK PHOS: 61 U/L / ALT: 61 U/L / AST: 77 U/L / GGT: x           PT/INR - ( 14 Mar 2019 04:27 )   PT: 12.9 sec;   INR: 1.13 ratio         PTT - ( 14 Mar 2019 18:04 )  PTT:139.0 sec  Urinalysis Basic - ( 14 Mar 2019 07:26 )    Color: Light Yellow / Appearance: Clear / SG: >1.050 / pH: x  Gluc: x / Ketone: Negative  / Bili: Negative / Urobili: Negative   Blood: x / Protein: Trace / Nitrite: Negative   Leuk Esterase: Negative / RBC: 5 /hpf / WBC 1 /HPF   Sq Epi: x / Non Sq Epi: 2 /hpf / Bacteria: Negative    < from: CT Chest w/ IV Cont (03.14.19 @ 04:34) >    EXAM:  CT ABDOMEN AND PELVIS IC                          EXAM:  CT CHEST IC                            PROCEDURE DATE:  03/14/2019            INTERPRETATION:  CLINICAL INFORMATION: Status post fall down 5-6 steps.    Right-sided chest pain and right upper quadrant pain.    COMPARISON: None.    PROCEDURE:   CT of the Chest, Abdomen and Pelvis was performed with intravenous   contrast.   Imaging was performed through the chest in the arterial phase followed by   imaging of the abdomen and pelvis in the portal venous phase.  Intravenous contrast: 90 ml Omnipaque 350. 10 ml discarded.  Oral contrast:None.  Sagittal and coronal reformats were performed.    FINDINGS:    CHEST:     LUNGS AND LARGE AIRWAYS: Patent central airways.  Bibasilar subsegmental   atelectasis.  2.5 mm right upper lobe pulmonary nodule (2:31).  Nodular   opacity in the right lower lobe measures 5.5 x 4 mm (4:309).  PLEURA: No   pleural effusion or pneumothorax.  VESSELS: No acute traumatic aortic injury.  There are small segmental   subsegmental pulmonary emboli in the medial basal and anterior basal   segments of the left lower lobe.  HEART: Heart size is normal. No pericardial effusion.  MEDIASTINUM AND ALKA: No lymphadenopathy.  CHEST WALL AND LOWER NECK: Heterogeneous left thyroid nodules measuring   2.5 x 2.4 cm (2:11) and 2.3 x 1.8 cm (2:14).  Right thyroid nodule   measuring 1.4 x 1 cm (2:7).  BONES: Mildly displaced fractures of the lateral right fifth and sixth   ribs.  Approximately 4.5 x 2 cm lesion in the proximal right humerus   appears to contain chondroid matrix and probably represent an   enchondroma.      ABDOMEN AND PELVIS:         LIVER: A 4-5 mm hypoattenuating focus in segment 7 (3:28) is too small to   characterize by CT scan.  BILE DUCTS: Normal caliber.  GALLBLADDER: Within normal limits.  SPLEEN: Within normal limits.  PANCREAS: Within normal limits.  ADRENALS: Within normal limits.  KIDNEYS/URETERS: Kidneys enhance symmetrically without hydronephrosis.    There are two punctate nonobstructing right lower pole renal stones that   both measure less than 2 mm (4:565 and 4:568).  A 3 mm hypoattenuating   focus in the right kidney (3:40) is too small to characterize by CT scan.    BLADDER: Within normal limits.  REPRODUCTIVE ORGANS: Endometrium measures approximately 7 mm.  A 1.8 x   1.1 cm lesion along the left side of the uterine fundus appears separate   from the left ovary and may reflect a pedunculated, subserosal fibroid   (3:87).     BOWEL: No bowel obstruction.  Appendix not visualized; no secondary   signs of appendicitis.  No colonic diverticular disease.  PERITONEUM: No   hemoperitoneum, ascites or free air.  VESSELS:  There is a sort segment severe stenosis in the proximal celiac   trunk which appears due to compression from the median arcuate ligament.  RETROPERITONEUM: No retroperitoneal hemorrhage.  No lymphadenopathy.    ABDOMINAL WALL: Within normal limits.  BONES: Thoracolumbar scoliosis and multilevel degenerative changes in the   spine.  The patient is status post L4-S1 posterior spinal fusion with   left-sided pedicle screws at L4, L5 and S1.  There are also interbody   fusions at L4-L5 and L5-S1.  Spinal hardware appears intact.  There is   bony ankylosis of the facet joints at L4-L5 bilaterally and L5-S1 on the   left.  Bony pelvis is intact.  A comminuted impacted fracture of the   distal left radius is incidentally noted.    CRITICAL VALUE: Acute pulmonary embolism was reported to Dr. Palacios in the   emergency department on 03/14/2019 at 5:00 AM.  Critical value policy of   the hospital was followed. Read back and confirmation of receipt of this   communication was performed. This verbal communication supplements the   text report of this document.    IMPRESSION:        CHEST:  1.  Acute displaced fractures of the right fifth and sixth ribs.  No   focal infiltrate, pleural effusion or pneumothorax.  2.  Small segmental and subsegmental pulmonary emboli are seen in the   medial basal and anterior basal segments of the left lower lobe.  3.  Right lower lobe nodular opacity measures 5.5 x 4 mm.  Based on 2017   Fleischner Society criteria this does not necessitate any additional   follow-up imaging.  However, if the patient is a smoker or has other risk   factors for lung cancer an optional follow-up chest CT scan can be   considered in twelve months to exclude lesion growth.  4.  Heterogeneous thyroid nodules measuring up to 2.5 cm.  Outpatient   thyroid ultrasound is recommended for further characterization.  5.  Bone lesion in the proximal right humerus probably represents an   enchondroma.  Dedicated humerus radiographs can be obtained for further   characterization.    ABDOMEN/PELVIS:  1.  Comminuted displaced fracture of the distal left radius is   incidentally noted.  Otherwise there is no evidence of acute traumatic   injury in the abdomen or pelvis.  2.  Endometrium measures approximately 7 mm which is thickened for the   patient's age.  Vaginal bleeding should be excluded clinically.  A 1.8 x   1.1 cm lesion along the left side of the uterine fundus appears separate   from the left ovary may reflect a pedunculated, subserosal fibroid.    Pelvic ultrasound is recommended for further characterization.  3. 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  Median arcuate ligament syndrome should be excluded   clinically.  4.  Punctate nonobstructive right lower pole renal stones.

## 2019-03-16 NOTE — PHYSICAL THERAPY INITIAL EVALUATION ADULT - GAIT TRAINING, PT EVAL
GOAL: Pt will ambulate 250' independently without AD  in 2-3 weeks. GOAL: Pt will ambulate 250' independently  using R standard cane  in 2-3 weeks.

## 2019-03-16 NOTE — PHYSICAL THERAPY INITIAL EVALUATION ADULT - REFERRING PHYSICIAN, REHAB EVAL
PT Orders: PT Evaluate and treat- on heparin gtt for subsegmental PE. OOB AS tolerated as per sx note 3/15/19

## 2019-03-16 NOTE — PHYSICAL THERAPY INITIAL EVALUATION ADULT - RANGE OF MOTION EXAMINATION, REHAB EVAL
LUE n/a/bilateral lower extremity was ROM was WNL (within normal limits)/Right UE ROM was WNL (within normal limits)

## 2019-03-16 NOTE — PHYSICAL THERAPY INITIAL EVALUATION ADULT - TRANSFER TRAINING, PT EVAL
GOAL: Pt will perform sit to/from stand transfers independently without AD within 2-3 weeks. GOAL: Pt will perform sit to/from stand transfers independently  using R standard cane  within 2-3 weeks.

## 2019-03-16 NOTE — PHYSICAL THERAPY INITIAL EVALUATION ADULT - DISCHARGE DISPOSITION, PT EVAL
home w/ home PT/Home with home PT for safety assessment, gait, balance, & strength training and to return pt to baseline functional mobility status. TBD pending sx 3/19/19 TBD pending sx 3/19/19 ( addendum 3/17/19)

## 2019-03-16 NOTE — PHYSICAL THERAPY INITIAL EVALUATION ADULT - PATIENT/FAMILY AGREES WITH PLAN
yes/Home with Home PT Home with home PT for safety assessment, gait, balance, & strength training and to return pt to baseline functional mobility status./yes yes/TBD pending surgery 3/19/19. yes/TBD pending surgery 3/19/19. (addendum 3/17/19)

## 2019-03-16 NOTE — PROGRESS NOTE ADULT - ASSESSMENT
ASSESSMENT   68F with PMHx of HTN, HLD, migraine, osteopenia, anxiety, and lumbar spinal fusion c/b radicular symptoms and gait instability who presented to the ED after a mechanical fall down 5 step and striking her face against a wall. Repeat CT head yesterday was negative, pending OR with orthopedics    Plan:  - Continue regular diet  - Heparin gtt to PE, plan to transition to a NOAC if possible  - F/u final read of post-reduction right hand Xrays  - Duplex study has been formally read  - Ortho: plan to take to OR for fixation on 3/19 will preop on 3/18  - Plastics will evaluate for nasal bone fracture and thumb subluxation  - Multimodal pain control  - Incentive spirometer    [facility] #[id number]

## 2019-03-16 NOTE — PROGRESS NOTE ADULT - SUBJECTIVE AND OBJECTIVE BOX
[facility] Progress Note    Interval: Patient had repeat CT head yesterday. Communicated with plastics regarding right thumb subluxation and nasal fractures, recs pending. Had post-reduction right hand Xrays taken yesterday, final read pending. No acute overnight events.    SUBJECTIVE: Patient seen and examined at the bedside. Feeling well this morning. Tolerating regular diet without nausea or vomiting. Pain is well controlled. Ambulating well with assistance.     VITALS  T(C): 37 (03-16-19 @ 04:04), Max: 37 (03-16-19 @ 04:04)  HR: 75 (03-16-19 @ 04:04) (66 - 75)  BP: 97/67 (03-16-19 @ 04:04) (93/63 - 110/70)  RR: 18 (03-16-19 @ 04:04) (18 - 19)  SpO2: 95% (03-16-19 @ 04:04) (95% - 96%)    Is/Os    03-14 @ 07:01  -  03-15 @ 07:00  --------------------------------------------------------  IN:    heparin  Infusion.: 120 mL  Total IN: 120 mL    OUT:    Voided: 800 mL  Total OUT: 800 mL    Total NET: -680 mL      03-15 @ 07:01  -  03-16 @ 05:40  --------------------------------------------------------  IN:    heparin  Infusion.: 132 mL    Oral Fluid: 410 mL  Total IN: 542 mL    OUT:  Total OUT: 0 mL    Total NET: 542 mL    PHYSICAL EXAM:   General: NAD, Lying in bed comfortably, alert, oriented x3  HEENT: Ecchymosis over right eye and eyelid, the area is tender to palpation  Pulm: Non-labored breathing on RA, 500-600cc on IS  GI/Abd: Soft, NT/ND, no rebound/guarding  Ext: left arm splinted, limtied ROM of forearm and left hand due to splint and pain,    MEDICATIONS (STANDING): heparin  Infusion.  Unit(s)/Hr IV Continuous <Continuous>  metoprolol succinate ER 25 milliGRAM(s) Oral daily  topiramate 100 milliGRAM(s) Oral two times a day  venlafaxine XR. 150 milliGRAM(s) Oral daily    MEDICATIONS (PRN):acetaminophen   Tablet .. 325 milliGRAM(s) Oral every 4 hours PRN Mild Pain (1 - 3)  heparin  Injectable 2500 Unit(s) IV Push every 6 hours PRN For aPTT between 40 - 57  heparin  Injectable 5500 Unit(s) IV Push every 6 hours PRN For aPTT less than 40  HYDROmorphone  Injectable 0.5 milliGRAM(s) IV Push every 4 hours PRN break through pain  oxyCODONE    5 mG/acetaminophen 325 mG 1 Tablet(s) Oral every 4 hours PRN Moderate Pain (4 - 6)  oxyCODONE    5 mG/acetaminophen 325 mG 2 Tablet(s) Oral every 6 hours PRN Severe Pain (7 - 10)    LABS  CBC (03-15 @ 06:32)                              14.5                           6.8     )----------------(  194        --    % Neutrophils, --    % Lymphocytes, ANC: --                                  44.6    CBC (03-15 @ 03:20)                              11.8                           8.55    )----------------(  222        78.5<H>% Neutrophils, 12.5<L>% Lymphocytes, ANC: 6.71                                37.4      BMP (03-15 @ 06:31)             140     |  106     |  17    		Ca++ --      Ca 9.7                ---------------------------------( 97    		Mg 2.0                4.3     |  19<L>   |  0.95  			Ph 2.7         Coags (03-15 @ 09:28)  aPTT 76.5<H> / INR -- / PT --  Coags (03-15 @ 06:33)  aPTT 65.8<H> / INR 1.27<H> / PT 14.7<H>          IMAGING STUDIES  < from: CT Head No Cont (03.15.19 @ 12:22) >  No acute intracranial hemorrhage or mass effect. No displaced calvarial   fracture.    < end of copied text >

## 2019-03-16 NOTE — PHYSICAL THERAPY INITIAL EVALUATION ADULT - BALANCE TRAINING, PT EVAL
GOAL: Pt will improve  balance during (static/dynamic) (sitting/standing) activities by at least 1 balance grade within 3-4 weeks to assist with greater independence during functional mobility and ADL's. GOAL: Pt will improve  balance during (static/dynamic) ( standing) activities by at least 1 balance grade within 3-4 weeks to assist with greater independence during functional mobility and ADL's.

## 2019-03-16 NOTE — PROGRESS NOTE ADULT - NSICDXPROBLEM_GEN_ALL_CORE_FT
PROBLEM DIAGNOSES  Problem: Fracture of wrist, left, closed, initial encounter  Assessment and Plan:  No contraindication to surgery as planned.    Problem: Multiple fractures of upper extremity and ribs  Assessment and Plan: recommendation as per cardiology    Problem: Pulmonary thromboembolism  Assessment and Plan: recommendation as given by trauma surgery

## 2019-03-16 NOTE — PHYSICAL THERAPY INITIAL EVALUATION ADULT - PERTINENT HX OF CURRENT PROBLEM, REHAB EVAL
68 year old female with PMHx of HTN, HLD, migraine, osteopenia, anxiety, and lumbar spinal fusion c/b radicular symptoms and gait instability who presented to the ED after a mechanical fall down 5 step and striking her face against a wall.   AN SANDHU.  pt. with subsegmental PE. on gtt heparin.

## 2019-03-17 LAB
APTT BLD: 106.8 SEC — HIGH (ref 27.5–36.3)
APTT BLD: 54.9 SEC — HIGH (ref 27.5–36.3)

## 2019-03-17 RX ORDER — POLYETHYLENE GLYCOL 3350 17 G/17G
17 POWDER, FOR SOLUTION ORAL ONCE
Qty: 0 | Refills: 0 | Status: COMPLETED | OUTPATIENT
Start: 2019-03-17 | End: 2019-03-17

## 2019-03-17 RX ORDER — OXYCODONE HYDROCHLORIDE 5 MG/1
5 TABLET ORAL EVERY 4 HOURS
Qty: 0 | Refills: 0 | Status: DISCONTINUED | OUTPATIENT
Start: 2019-03-17 | End: 2019-03-19

## 2019-03-17 RX ORDER — DOCUSATE SODIUM 100 MG
100 CAPSULE ORAL THREE TIMES A DAY
Qty: 0 | Refills: 0 | Status: DISCONTINUED | OUTPATIENT
Start: 2019-03-17 | End: 2019-03-19

## 2019-03-17 RX ORDER — FAMOTIDINE 10 MG/ML
20 INJECTION INTRAVENOUS
Qty: 0 | Refills: 0 | Status: DISCONTINUED | OUTPATIENT
Start: 2019-03-17 | End: 2019-03-19

## 2019-03-17 RX ORDER — OXYCODONE HYDROCHLORIDE 5 MG/1
10 TABLET ORAL EVERY 4 HOURS
Qty: 0 | Refills: 0 | Status: DISCONTINUED | OUTPATIENT
Start: 2019-03-17 | End: 2019-03-19

## 2019-03-17 RX ORDER — ACETAMINOPHEN 500 MG
975 TABLET ORAL EVERY 6 HOURS
Qty: 0 | Refills: 0 | Status: DISCONTINUED | OUTPATIENT
Start: 2019-03-17 | End: 2019-03-19

## 2019-03-17 RX ADMIN — Medication 100 MILLIGRAM(S): at 12:01

## 2019-03-17 RX ADMIN — Medication 100 MILLIGRAM(S): at 06:43

## 2019-03-17 RX ADMIN — Medication 975 MILLIGRAM(S): at 23:47

## 2019-03-17 RX ADMIN — Medication 975 MILLIGRAM(S): at 22:47

## 2019-03-17 RX ADMIN — OXYCODONE HYDROCHLORIDE 10 MILLIGRAM(S): 5 TABLET ORAL at 17:32

## 2019-03-17 RX ADMIN — OXYCODONE HYDROCHLORIDE 10 MILLIGRAM(S): 5 TABLET ORAL at 08:22

## 2019-03-17 RX ADMIN — Medication 1 TABLET(S): at 12:00

## 2019-03-17 RX ADMIN — OXYCODONE HYDROCHLORIDE 10 MILLIGRAM(S): 5 TABLET ORAL at 23:48

## 2019-03-17 RX ADMIN — HEPARIN SODIUM 2500 UNIT(S): 5000 INJECTION INTRAVENOUS; SUBCUTANEOUS at 12:11

## 2019-03-17 RX ADMIN — Medication 100 MILLIGRAM(S): at 17:02

## 2019-03-17 RX ADMIN — OXYCODONE HYDROCHLORIDE 10 MILLIGRAM(S): 5 TABLET ORAL at 07:48

## 2019-03-17 RX ADMIN — HEPARIN SODIUM 1100 UNIT(S)/HR: 5000 INJECTION INTRAVENOUS; SUBCUTANEOUS at 12:01

## 2019-03-17 RX ADMIN — Medication 975 MILLIGRAM(S): at 08:22

## 2019-03-17 RX ADMIN — Medication 150 MILLIGRAM(S): at 12:00

## 2019-03-17 RX ADMIN — Medication 975 MILLIGRAM(S): at 07:47

## 2019-03-17 RX ADMIN — POLYETHYLENE GLYCOL 3350 17 GRAM(S): 17 POWDER, FOR SOLUTION ORAL at 12:00

## 2019-03-17 RX ADMIN — FAMOTIDINE 20 MILLIGRAM(S): 10 INJECTION INTRAVENOUS at 17:02

## 2019-03-17 RX ADMIN — Medication 100 MILLIGRAM(S): at 22:46

## 2019-03-17 RX ADMIN — HEPARIN SODIUM 1000 UNIT(S)/HR: 5000 INJECTION INTRAVENOUS; SUBCUTANEOUS at 18:25

## 2019-03-17 RX ADMIN — OXYCODONE HYDROCHLORIDE 10 MILLIGRAM(S): 5 TABLET ORAL at 22:48

## 2019-03-17 RX ADMIN — LIDOCAINE 1 PATCH: 4 CREAM TOPICAL at 12:00

## 2019-03-17 RX ADMIN — OXYCODONE HYDROCHLORIDE 10 MILLIGRAM(S): 5 TABLET ORAL at 17:02

## 2019-03-17 RX ADMIN — LIDOCAINE 1 PATCH: 4 CREAM TOPICAL at 18:46

## 2019-03-17 RX ADMIN — FAMOTIDINE 20 MILLIGRAM(S): 10 INJECTION INTRAVENOUS at 09:21

## 2019-03-17 RX ADMIN — Medication 25 MILLIGRAM(S): at 06:43

## 2019-03-17 NOTE — PROGRESS NOTE ADULT - ATTENDING COMMENTS
The patient is medically stable, medically optimized and has no medical contraindication to surgery tomorrow as required. Exam time 30 minutes including > than 50 % for bedside discussion and counseling.

## 2019-03-17 NOTE — PROGRESS NOTE ADULT - SUBJECTIVE AND OBJECTIVE BOX
Hospital  Day [x ]    Patient resting without complaints.  No chest pain, SOB, N/V.  Waiting Surgery    T(C): 37 (03-17-19 @ 06:21), Max: 37.1 (03-17-19 @ 00:12)  HR: 84 (03-17-19 @ 06:21) (70 - 84)  BP: 106/63 (03-17-19 @ 06:21) (91/51 - 106/63)  RR: 18 (03-17-19 @ 06:21) (18 - 18)  SpO2: 94% (03-17-19 @ 06:21) (94% - 100%)  Wt(kg): --I&O's Summary    16 Mar 2019 07:01  -  17 Mar 2019 07:00  --------------------------------------------------------  IN: 530 mL / OUT: 0 mL / NET: 530 mL        Exam:  Alert and Oriented, No Acute Distress  CARDS: +S1/S2, RRR  PULM: (?) decr R Side  ABD: soft benign            Kerr Yes [ ]  No x[ ]  EXT:           LUE:          Splint  C/D          n/v/i                              13.3   7.6   )-----------( 214      ( 16 Mar 2019 06:58 )             39.7    03-16    137  |  105  |  19  ----------------------------<  99  4.2   |  19<L>  |  0.80    Ca    8.7      16 Mar 2019 06:58  Phos  2.4     03-16  Mg     1.8     03-16          . Hospital  Day [x ]    Patient resting without complaints.  No chest pain, SOB, N/V.  Waiting Surgery    T(C): 37 (03-17-19 @ 06:21), Max: 37.1 (03-17-19 @ 00:12)  HR: 84 (03-17-19 @ 06:21) (70 - 84)  BP: 106/63 (03-17-19 @ 06:21) (91/51 - 106/63)  RR: 18 (03-17-19 @ 06:21) (18 - 18)  SpO2: 94% (03-17-19 @ 06:21) (94% - 100%)  Wt(kg): --I&O's Summary    16 Mar 2019 07:01  -  17 Mar 2019 07:00  --------------------------------------------------------  IN: 530 mL / OUT: 0 mL / NET: 530 mL        Exam:  Alert and Oriented, No Acute Distress  CARDS: +S1/S2, RRR  PULM: (?) decr R Side  ABD: soft benign            Kerr Yes [ ]  No x[ ]  EXT:           LUE:          Splint  C/D          n/v/i                              13.3   7.6   )-----------( 214      ( 16 Mar 2019 06:58 )             39.7    03-16    137  |  105  |  19  ----------------------------<  99  4.2   |  19<L>  |  0.80    Ca    8.7      16 Mar 2019 06:58  Phos  2.4     03-16  Mg     1.8     03-16        EXAM:  CT ABDOMEN AND PELVIS IC                          EXAM:  CT CHEST IC                        PROCEDURE DATE:  03/14/2019    INTERPRETATION:  CLINICAL INFORMATION: Status post fall down 5-6 steps.    Right-sided chest pain and right upper quadrant pain.    COMPARISON: None.    IMPRESSION:        CHEST:  1.  Acute displaced fractures of the right fifth and sixth ribs.  No   focal infiltrate, pleural effusion or pneumothorax.  2.  Small segmental and subsegmental pulmonary emboli are seen in the   medial basal and anterior basal segments of the left lower lobe.  3.  Right lower lobe nodular opacity measures 5.5 x 4 mm.  Based on 2017   Fleischner Society criteria this does not necessitate any additional   follow-up imaging.  However, if the patient is a smoker or has other risk   factors for lung cancer an optional follow-up chest CT scan can be   considered in twelve months to exclude lesion growth.  4.  Heterogeneous thyroid nodules measuring up to 2.5 cm.  Outpatient   thyroid ultrasound is recommended for further characterization.  5.  Bone lesion in the proximal right humerus probably represents an   enchondroma.  Dedicated humerus radiographs can be obtained for further   characterization.    ABDOMEN/PELVIS:  1.  Comminuted displaced fracture of the distal left radius is   incidentally noted.  Otherwise there is no evidence of acute traumatic   injury in the abdomen or pelvis.  2.  Endometrium measures approximately 7 mm which is thickened for the   patient's age.  Vaginal bleeding should be excluded clinically.  A 1.8 x   1.1 cm lesion along the left side of the uterine fundus appears separate   from the left ovary may reflect a pedunculated, subserosal fibroid.    Pelvic ultrasound is recommended for further characterization.  3. 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  Median arcuate ligament syndrome should be excluded   clinically.  4.  Punctate nonobstructive right lower pole renal stones.    EXAM:  CT BRAIN                        PROCEDURE DATE:  03/15/2019      IMPRESSION:    No acute intracranial hemorrhage or mass effect. No displaced calvarial   fracture.                .

## 2019-03-17 NOTE — OCCUPATIONAL THERAPY INITIAL EVALUATION ADULT - LIVES WITH, PROFILE
Pt lives with  in private home with 1 step to enter, stairs to negotiate indoors, walk in shower. Pt I in ADLs and ambulation prior to admission/spouse

## 2019-03-17 NOTE — PROGRESS NOTE ADULT - SUBJECTIVE AND OBJECTIVE BOX
68 YOF not on ac s/p fall after slipping on step, pt fell down multiple steps <5 unsure pt was going down stairs in dark. Pt unable to get back up, has left wrist deformity. Pt complaining of neck pain, right sided chest pain, right upper quadrant abdominal pain, generalized back pain. Pt denies any LOC, denies fever, chills cough, no lightheadedness prior to fall, no chest pain prior to fall. Patient found to have a left wrist fx and rib fxs.  Patient awaiting OR. moving fingers without     problems  Encouraged deep breathing exercises    MEDICATIONS  (STANDING):  heparin  Infusion.  Unit(s)/Hr (12 mL/Hr) IV Continuous <Continuous>  lidocaine   Patch 1 Patch Transdermal daily  metoprolol succinate ER 25 milliGRAM(s) Oral daily  potassium chloride    Tablet ER 40 milliEquivalent(s) Oral every 4 hours  topiramate 100 milliGRAM(s) Oral two times a day  venlafaxine XR. 150 milliGRAM(s) Oral daily    MEDICATIONS  (PRN):  acetaminophen   Tablet .. 325 milliGRAM(s) Oral every 4 hours PRN Mild Pain (1 - 3)  heparin  Injectable 2500 Unit(s) IV Push every 6 hours PRN For aPTT between 40 - 57  heparin  Injectable 5500 Unit(s) IV Push every 6 hours PRN For aPTT less than 40  HYDROmorphone  Injectable 0.5 milliGRAM(s) IV Push every 4 hours PRN break through pain  oxyCODONE    5 mG/acetaminophen 325 mG 1 Tablet(s) Oral every 4 hours PRN Moderate Pain (4 - 6)  oxyCODONE    5 mG/acetaminophen 325 mG 2 Tablet(s) Oral every 6 hours PRN Severe Pain (7 - 10)        Vital Signs Last 24 Hrs  T(C): 36.8 (16 Mar 2019 19:02), Max: 37 (16 Mar 2019 04:04)  T(F): 98.3 (16 Mar 2019 19:02), Max: 98.6 (16 Mar 2019 04:04)  HR: 79 (16 Mar 2019 19:02) (70 - 79)  BP: 99/61 (16 Mar 2019 19:02) (95/58 - 125/79)  BP(mean): --  RR: 18 (16 Mar 2019 19:02) (18 - 18)  SpO2: 94% (16 Mar 2019 19:02) (94% - 100%)        Physical Exam  General: WN/WD NAD  Neurology: A&Ox3, nonfocal, [place] x 4  Eyes: PERRLA/ EOMI, Gross vision intact  ENT/Neck: Neck supple, trachea midline, No JVD, Gross hearing intact  Respiratory: decreased effort due to pain  CV: RRR, S1S2, no murmurs, rubs or gallops  Abdominal: Soft, NT, ND +BS,   Extremities: left wrist in splint  OR rescheduled for 3/19  Skin: No Rashes, Hematoma, Ecchymosis      LABS:    CBC Full  -  ( 15 Mar 2019 06:32 )  WBC Count : 6.8 K/uL  Hemoglobin : 14.5 g/dL  Hematocrit : 44.6 %  Platelet Count - Automated : 194 K/uL  Mean Cell Volume : 88.6 fl  Mean Cell Hemoglobin : 28.9 pg  Mean Cell Hemoglobin Concentration : 32.6 gm/dL  Auto Neutrophil # : x  Auto Lymphocyte # : x  Auto Monocyte # : x  Auto Eosinophil # : x  Auto Basophil # : x  Auto Neutrophil % : x  Auto Lymphocyte % : x  Auto Monocyte % : x  Auto Eosinophil % : x  Auto Basophil % : x    03-15    140  |  106  |  17  ----------------------------<  97  4.3   |  19<L>  |  0.95    Ca    9.7      15 Mar 2019 06:31  Phos  2.7     03-15  Mg     2.0     03-15    TPro  6.9  /  Alb  3.8  /  TBili  0.3  /  DBili  x   /  AST  77<H>  /  ALT  61<H>  /  AlkPhos  61  03-14    LIVER FUNCTIONS - ( 14 Mar 2019 04:27 )  Alb: 3.8 g/dL / Pro: 6.9 g/dL / ALK PHOS: 61 U/L / ALT: 61 U/L / AST: 77 U/L / GGT: x           PT/INR - ( 15 Mar 2019 06:33 )   PT: 14.7 sec;   INR: 1.27 ratio         PTT - ( 15 Mar 2019 09:28 )  PTT:76.5 sec  Urinalysis Basic - ( 14 Mar 2019 07:26 )    Color: Light Yellow / Appearance: Clear / SG: >1.050 / pH: x  Gluc: x / Ketone: Negative  / Bili: Negative / Urobili: Negative   Blood: x / Protein: Trace / Nitrite: Negative   Leuk Esterase: Negative / RBC: 5 /hpf / WBC 1 /HPF   Sq Epi: x / Non Sq Epi: 2 /hpf / Bacteria: Negative        CBC Full  -  ( 14 Mar 2019 18:04 )  WBC Count : 8.4 K/uL  Hemoglobin : 13.8 g/dL  Hematocrit : 41.9 %  Platelet Count - Automated : 219 K/uL  Mean Cell Volume : 86.1 fl  Mean Cell Hemoglobin : 28.3 pg  Mean Cell Hemoglobin Concentration : 32.8 gm/dL  Auto Neutrophil # : x  Auto Lymphocyte # : x  Auto Monocyte # : x  Auto Eosinophil # : x  Auto Basophil # : x  Auto Neutrophil % : x  Auto Lymphocyte % : x  Auto Monocyte % : x  Auto Eosinophil % : x  Auto Basophil % : x    03-14    138  |  100  |  21  ----------------------------<  152<H>  2.8<LL>   |  25  |  0.82    Ca    8.8      14 Mar 2019 17:04    TPro  6.9  /  Alb  3.8  /  TBili  0.3  /  DBili  x   /  AST  77<H>  /  ALT  61<H>  /  AlkPhos  61  03-14    LIVER FUNCTIONS - ( 14 Mar 2019 04:27 )  Alb: 3.8 g/dL / Pro: 6.9 g/dL / ALK PHOS: 61 U/L / ALT: 61 U/L / AST: 77 U/L / GGT: x           PT/INR - ( 14 Mar 2019 04:27 )   PT: 12.9 sec;   INR: 1.13 ratio         PTT - ( 14 Mar 2019 18:04 )  PTT:139.0 sec  Urinalysis Basic - ( 14 Mar 2019 07:26 )    Color: Light Yellow / Appearance: Clear / SG: >1.050 / pH: x  Gluc: x / Ketone: Negative  / Bili: Negative / Urobili: Negative   Blood: x / Protein: Trace / Nitrite: Negative   Leuk Esterase: Negative / RBC: 5 /hpf / WBC 1 /HPF   Sq Epi: x / Non Sq Epi: 2 /hpf / Bacteria: Negative    < from: CT Chest w/ IV Cont (03.14.19 @ 04:34) >    EXAM:  CT ABDOMEN AND PELVIS IC                          EXAM:  CT CHEST IC                            PROCEDURE DATE:  03/14/2019            INTERPRETATION:  CLINICAL INFORMATION: Status post fall down 5-6 steps.    Right-sided chest pain and right upper quadrant pain.    COMPARISON: None.    PROCEDURE:   CT of the Chest, Abdomen and Pelvis was performed with intravenous   contrast.   Imaging was performed through the chest in the arterial phase followed by   imaging of the abdomen and pelvis in the portal venous phase.  Intravenous contrast: 90 ml Omnipaque 350. 10 ml discarded.  Oral contrast:None.  Sagittal and coronal reformats were performed.    FINDINGS:    CHEST:     LUNGS AND LARGE AIRWAYS: Patent central airways.  Bibasilar subsegmental   atelectasis.  2.5 mm right upper lobe pulmonary nodule (2:31).  Nodular   opacity in the right lower lobe measures 5.5 x 4 mm (4:309).  PLEURA: No   pleural effusion or pneumothorax.  VESSELS: No acute traumatic aortic injury.  There are small segmental   subsegmental pulmonary emboli in the medial basal and anterior basal   segments of the left lower lobe.  HEART: Heart size is normal. No pericardial effusion.  MEDIASTINUM AND ALKA: No lymphadenopathy.  CHEST WALL AND LOWER NECK: Heterogeneous left thyroid nodules measuring   2.5 x 2.4 cm (2:11) and 2.3 x 1.8 cm (2:14).  Right thyroid nodule   measuring 1.4 x 1 cm (2:7).  BONES: Mildly displaced fractures of the lateral right fifth and sixth   ribs.  Approximately 4.5 x 2 cm lesion in the proximal right humerus   appears to contain chondroid matrix and probably represent an   enchondroma.      ABDOMEN AND PELVIS:         LIVER: A 4-5 mm hypoattenuating focus in segment 7 (3:28) is too small to   characterize by CT scan.  BILE DUCTS: Normal caliber.  GALLBLADDER: Within normal limits.  SPLEEN: Within normal limits.  PANCREAS: Within normal limits.  ADRENALS: Within normal limits.  KIDNEYS/URETERS: Kidneys enhance symmetrically without hydronephrosis.    There are two punctate nonobstructing right lower pole renal stones that   both measure less than 2 mm (4:565 and 4:568).  A 3 mm hypoattenuating   focus in the right kidney (3:40) is too small to characterize by CT scan.    BLADDER: Within normal limits.  REPRODUCTIVE ORGANS: Endometrium measures approximately 7 mm.  A 1.8 x   1.1 cm lesion along the left side of the uterine fundus appears separate   from the left ovary and may reflect a pedunculated, subserosal fibroid   (3:87).     BOWEL: No bowel obstruction.  Appendix not visualized; no secondary   signs of appendicitis.  No colonic diverticular disease.  PERITONEUM: No   hemoperitoneum, ascites or free air.  VESSELS:  There is a sort segment severe stenosis in the proximal celiac   trunk which appears due to compression from the median arcuate ligament.  RETROPERITONEUM: No retroperitoneal hemorrhage.  No lymphadenopathy.    ABDOMINAL WALL: Within normal limits.  BONES: Thoracolumbar scoliosis and multilevel degenerative changes in the   spine.  The patient is status post L4-S1 posterior spinal fusion with   left-sided pedicle screws at L4, L5 and S1.  There are also interbody   fusions at L4-L5 and L5-S1.  Spinal hardware appears intact.  There is   bony ankylosis of the facet joints at L4-L5 bilaterally and L5-S1 on the   left.  Bony pelvis is intact.  A comminuted impacted fracture of the   distal left radius is incidentally noted.    CRITICAL VALUE: Acute pulmonary embolism was reported to Dr. Palacios in the   emergency department on 03/14/2019 at 5:00 AM.  Critical value policy of   the hospital was followed. Read back and confirmation of receipt of this   communication was performed. This verbal communication supplements the   text report of this document.    IMPRESSION:        CHEST:  1.  Acute displaced fractures of the right fifth and sixth ribs.  No   focal infiltrate, pleural effusion or pneumothorax.  2.  Small segmental and subsegmental pulmonary emboli are seen in the   medial basal and anterior basal segments of the left lower lobe.  3.  Right lower lobe nodular opacity measures 5.5 x 4 mm.  Based on 2017   Fleischner Society criteria this does not necessitate any additional   follow-up imaging.  However, if the patient is a smoker or has other risk   factors for lung cancer an optional follow-up chest CT scan can be   considered in twelve months to exclude lesion growth.  4.  Heterogeneous thyroid nodules measuring up to 2.5 cm.  Outpatient   thyroid ultrasound is recommended for further characterization.  5.  Bone lesion in the proximal right humerus probably represents an   enchondroma.  Dedicated humerus radiographs can be obtained for further   characterization.    ABDOMEN/PELVIS:  1.  Comminuted displaced fracture of the distal left radius is   incidentally noted.  Otherwise there is no evidence of acute traumatic   injury in the abdomen or pelvis.  2.  Endometrium measures approximately 7 mm which is thickened for the   patient's age.  Vaginal bleeding should be excluded clinically.  A 1.8 x   1.1 cm lesion along the left side of the uterine fundus appears separate   from the left ovary may reflect a pedunculated, subserosal fibroid.    Pelvic ultrasound is recommended for further characterization.  3. 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  Median arcuate ligament syndrome should be excluded   clinically.  4.  Punctate nonobstructive right lower pole renal stones. 68 YOF not on ac s/p fall after slipping on step, pt fell down multiple steps <5 unsure pt was going down stairs in dark. Pt unable to get back up, has left wrist deformity. Pt complaining of neck pain, right sided chest pain, right upper quadrant abdominal pain, generalized back pain. Pt denies any LOC, denies fever, chills cough, no lightheadedness prior to fall, no chest pain prior to fall. Patient found to have a left wrist fx and rib fxs.  Patient awaiting OR. moving fingers without     problems  Encouraged deep breathing exercises    MEDICATIONS  (STANDING):  heparin  Infusion.  Unit(s)/Hr (12 mL/Hr) IV Continuous <Continuous>  lidocaine   Patch 1 Patch Transdermal daily  metoprolol succinate ER 25 milliGRAM(s) Oral daily  potassium chloride    Tablet ER 40 milliEquivalent(s) Oral every 4 hours  topiramate 100 milliGRAM(s) Oral two times a day  venlafaxine XR. 150 milliGRAM(s) Oral daily    MEDICATIONS  (PRN):  acetaminophen   Tablet .. 325 milliGRAM(s) Oral every 4 hours PRN Mild Pain (1 - 3)  heparin  Injectable 2500 Unit(s) IV Push every 6 hours PRN For aPTT between 40 - 57  heparin  Injectable 5500 Unit(s) IV Push every 6 hours PRN For aPTT less than 40  HYDROmorphone  Injectable 0.5 milliGRAM(s) IV Push every 4 hours PRN break through pain  oxyCODONE    5 mG/acetaminophen 325 mG 1 Tablet(s) Oral every 4 hours PRN Moderate Pain (4 - 6)  oxyCODONE    5 mG/acetaminophen 325 mG 2 Tablet(s) Oral every 6 hours PRN Severe Pain (7 - 10)        Vital Signs Last 24 Hrs      T(C): 37 (03-17-19 @ 06:21), Max: 37.1 (03-17-19 @ 00:12)  HR: 84 (03-17-19 @ 06:21) (70 - 84)  BP: 106/63 (03-17-19 @ 06:21) (91/51 - 106/63)  RR: 18 (03-17-19 @ 06:21) (18 - 18)  SpO2: 94% (03-17-19 @ 06:21) (94% - 100%)  Wt(kg): --I&O's Summary    16 Mar 2019 07:01  -  17 Mar 2019 07:00  --------------------------------------------------------  IN: 530 mL / OUT: 0 mL / NET: 530 mL      Physical Exam  General: WN/WD NAD  Neurology: A&Ox3, nonfocal, [place] x 4  Eyes: PERRLA/ EOMI, Gross vision intact  ENT/Neck: Neck supple, trachea midline, No JVD, Gross hearing intact  Respiratory: decreased effort due to pain  CV: RRR, S1S2, no murmurs, rubs or gallops  Abdominal: Soft, NT, ND +BS,   Extremities: left wrist in splint  OR rescheduled for 3/19  Skin: No Rashes, Hematoma, Ecchymosis      LABS:             PT/INR - ( 15 Mar 2019 06:33 )   PT: 14.7 sec;   INR: 1.27 ratio         PTT - ( 15 Mar 2019 09:28 )  PTT:76.5 sec  Urinalysis Basic - ( 14 Mar 2019 07:26 )    Color: Light Yellow / Appearance: Clear / SG: >1.050 / pH: x  Gluc: x / Ketone: Negative  / Bili: Negative / Urobili: Negative   Blood: x / Protein: Trace / Nitrite: Negative   Leuk Esterase: Negative / RBC: 5 /hpf / WBC 1 /HPF   Sq Epi: x / Non Sq Epi: 2 /hpf / Bacteria: Negative                 13.3   7.6   )-----------( 214      ( 16 Mar 2019 06:58 )             39.7    03-16    137  |  105  |  19  ----------------------------<  99  4.2   |  19<L>  |  0.80    Ca    8.7      16 Mar 2019 06:58  Phos  2.4     03-16  Mg     1.8     03-16    < from: CT Chest w/ IV Cont (03.14.19 @ 04:34) >    EXAM:  CT ABDOMEN AND PELVIS IC                          EXAM:  CT CHEST IC                            PROCEDURE DATE:  03/14/2019            INTERPRETATION:  CLINICAL INFORMATION: Status post fall down 5-6 steps.    Right-sided chest pain and right upper quadrant pain.    COMPARISON: None.    PROCEDURE:   CT of the Chest, Abdomen and Pelvis was performed with intravenous   contrast.   Imaging was performed through the chest in the arterial phase followed by   imaging of the abdomen and pelvis in the portal venous phase.  Intravenous contrast: 90 ml Omnipaque 350. 10 ml discarded.  Oral contrast:None.  Sagittal and coronal reformats were performed.    FINDINGS:    CHEST:     LUNGS AND LARGE AIRWAYS: Patent central airways.  Bibasilar subsegmental   atelectasis.  2.5 mm right upper lobe pulmonary nodule (2:31).  Nodular   opacity in the right lower lobe measures 5.5 x 4 mm (4:309).  PLEURA: No   pleural effusion or pneumothorax.  VESSELS: No acute traumatic aortic injury.  There are small segmental   subsegmental pulmonary emboli in the medial basal and anterior basal   segments of the left lower lobe.  HEART: Heart size is normal. No pericardial effusion.  MEDIASTINUM AND ALKA: No lymphadenopathy.  CHEST WALL AND LOWER NECK: Heterogeneous left thyroid nodules measuring   2.5 x 2.4 cm (2:11) and 2.3 x 1.8 cm (2:14).  Right thyroid nodule   measuring 1.4 x 1 cm (2:7).  BONES: Mildly displaced fractures of the lateral right fifth and sixth   ribs.  Approximately 4.5 x 2 cm lesion in the proximal right humerus   appears to contain chondroid matrix and probably represent an   enchondroma.      ABDOMEN AND PELVIS:         LIVER: A 4-5 mm hypoattenuating focus in segment 7 (3:28) is too small to   characterize by CT scan.  BILE DUCTS: Normal caliber.  GALLBLADDER: Within normal limits.  SPLEEN: Within normal limits.  PANCREAS: Within normal limits.  ADRENALS: Within normal limits.  KIDNEYS/URETERS: Kidneys enhance symmetrically without hydronephrosis.    There are two punctate nonobstructing right lower pole renal stones that   both measure less than 2 mm (4:565 and 4:568).  A 3 mm hypoattenuating   focus in the right kidney (3:40) is too small to characterize by CT scan.    BLADDER: Within normal limits.  REPRODUCTIVE ORGANS: Endometrium measures approximately 7 mm.  A 1.8 x   1.1 cm lesion along the left side of the uterine fundus appears separate   from the left ovary and may reflect a pedunculated, subserosal fibroid   (3:87).     BOWEL: No bowel obstruction.  Appendix not visualized; no secondary   signs of appendicitis.  No colonic diverticular disease.  PERITONEUM: No   hemoperitoneum, ascites or free air.  VESSELS:  There is a sort segment severe stenosis in the proximal celiac   trunk which appears due to compression from the median arcuate ligament.  RETROPERITONEUM: No retroperitoneal hemorrhage.  No lymphadenopathy.    ABDOMINAL WALL: Within normal limits.  BONES: Thoracolumbar scoliosis and multilevel degenerative changes in the   spine.  The patient is status post L4-S1 posterior spinal fusion with   left-sided pedicle screws at L4, L5 and S1.  There are also interbody   fusions at L4-L5 and L5-S1.  Spinal hardware appears intact.  There is   bony ankylosis of the facet joints at L4-L5 bilaterally and L5-S1 on the   left.  Bony pelvis is intact.  A comminuted impacted fracture of the   distal left radius is incidentally noted.    CRITICAL VALUE: Acute pulmonary embolism was reported to Dr. Palacios in the   emergency department on 03/14/2019 at 5:00 AM.  Critical value policy of   the hospital was followed. Read back and confirmation of receipt of this   communication was performed. This verbal communication supplements the   text report of this document.    IMPRESSION:        CHEST:  1.  Acute displaced fractures of the right fifth and sixth ribs.  No   focal infiltrate, pleural effusion or pneumothorax.  2.  Small segmental and subsegmental pulmonary emboli are seen in the   medial basal and anterior basal segments of the left lower lobe.  3.  Right lower lobe nodular opacity measures 5.5 x 4 mm.  Based on 2017   Fleischner Society criteria this does not necessitate any additional   follow-up imaging.  However, if the patient is a smoker or has other risk   factors for lung cancer an optional follow-up chest CT scan can be   considered in twelve months to exclude lesion growth.  4.  Heterogeneous thyroid nodules measuring up to 2.5 cm.  Outpatient   thyroid ultrasound is recommended for further characterization.  5.  Bone lesion in the proximal right humerus probably represents an   enchondroma.  Dedicated humerus radiographs can be obtained for further   characterization.    ABDOMEN/PELVIS:  1.  Comminuted displaced fracture of the distal left radius is   incidentally noted.  Otherwise there is no evidence of acute traumatic   injury in the abdomen or pelvis.  2.  Endometrium measures approximately 7 mm which is thickened for the   patient's age.  Vaginal bleeding should be excluded clinically.  A 1.8 x   1.1 cm lesion along the left side of the uterine fundus appears separate   from the left ovary may reflect a pedunculated, subserosal fibroid.    Pelvic ultrasound is recommended for further characterization.  3. 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  Median arcuate ligament syndrome should be excluded   clinically.  4.  Punctate nonobstructive right lower pole renal stones.

## 2019-03-17 NOTE — OCCUPATIONAL THERAPY INITIAL EVALUATION ADULT - ADDITIONAL COMMENTS
CT Head: (-)  CT Maxillofacial: Question nondisplaced fracture of the anterior nasal spine. No additional maxillofacial bone fracture.  CT Cervical Spine: No acute cervical spine fracture or evidence of traumatic malalignment.  Xray Bilateral Hands: Ulnar subluxation of the proximal phalanx of the right thumb at the metacarpophalangeal joint. Acute comminuted impacted fracture of the distal left radius with associated soft tissue swelling.   Xray Pelvis: (-)

## 2019-03-17 NOTE — PROGRESS NOTE ADULT - ASSESSMENT
Assessment: 59 y/o w/ MULTIPLE injuries AND L DR fx    Plan:   Cont Heparin drip  Follow up PRS and Trauma  ice / elevate  for OR 3/19  cont current care      ***See Above  Malcolm PARIS  Orthopedics  B: [phone]  S: [phone]

## 2019-03-17 NOTE — OCCUPATIONAL THERAPY INITIAL EVALUATION ADULT - PERTINENT HX OF CURRENT PROBLEM, REHAB EVAL
69 yo F presented to the ED after a mechanical fall down 5 step and striking her face against a wall. Pt states that she was walking down the stairs in the dark, when she missed a step, falling forward. She did not fall to the floor, but was unable to catch her balance, and struck her face against the wall at the bottom of the steps. Pt denies any LOC as a results of the accident. She was ambulatory immediately afterwards.  See below

## 2019-03-18 ENCOUNTER — TRANSCRIPTION ENCOUNTER (OUTPATIENT)
Age: 69
End: 2019-03-18

## 2019-03-18 LAB
APTT BLD: 56.9 SEC — HIGH (ref 27.5–36.3)
APTT BLD: 73.7 SEC — HIGH (ref 27.5–36.3)
APTT BLD: 83 SEC — HIGH (ref 27.5–36.3)
INR BLD: 1.2 RATIO — HIGH (ref 0.88–1.16)
PROTHROM AB SERPL-ACNC: 13.9 SEC — HIGH (ref 10–12.9)

## 2019-03-18 RX ORDER — SODIUM CHLORIDE 9 MG/ML
1000 INJECTION INTRAMUSCULAR; INTRAVENOUS; SUBCUTANEOUS
Qty: 0 | Refills: 0 | Status: DISCONTINUED | OUTPATIENT
Start: 2019-03-18 | End: 2019-03-19

## 2019-03-18 RX ADMIN — Medication 100 MILLIGRAM(S): at 06:44

## 2019-03-18 RX ADMIN — Medication 975 MILLIGRAM(S): at 22:15

## 2019-03-18 RX ADMIN — FAMOTIDINE 20 MILLIGRAM(S): 10 INJECTION INTRAVENOUS at 17:36

## 2019-03-18 RX ADMIN — HEPARIN SODIUM 1100 UNIT(S)/HR: 5000 INJECTION INTRAVENOUS; SUBCUTANEOUS at 10:34

## 2019-03-18 RX ADMIN — Medication 975 MILLIGRAM(S): at 06:44

## 2019-03-18 RX ADMIN — OXYCODONE HYDROCHLORIDE 10 MILLIGRAM(S): 5 TABLET ORAL at 11:10

## 2019-03-18 RX ADMIN — OXYCODONE HYDROCHLORIDE 10 MILLIGRAM(S): 5 TABLET ORAL at 21:40

## 2019-03-18 RX ADMIN — Medication 975 MILLIGRAM(S): at 07:35

## 2019-03-18 RX ADMIN — LIDOCAINE 1 PATCH: 4 CREAM TOPICAL at 20:09

## 2019-03-18 RX ADMIN — HEPARIN SODIUM 1100 UNIT(S)/HR: 5000 INJECTION INTRAVENOUS; SUBCUTANEOUS at 03:43

## 2019-03-18 RX ADMIN — LIDOCAINE 1 PATCH: 4 CREAM TOPICAL at 12:19

## 2019-03-18 RX ADMIN — Medication 100 MILLIGRAM(S): at 21:40

## 2019-03-18 RX ADMIN — Medication 1 TABLET(S): at 12:04

## 2019-03-18 RX ADMIN — FAMOTIDINE 20 MILLIGRAM(S): 10 INJECTION INTRAVENOUS at 06:43

## 2019-03-18 RX ADMIN — Medication 25 MILLIGRAM(S): at 06:44

## 2019-03-18 RX ADMIN — Medication 975 MILLIGRAM(S): at 21:44

## 2019-03-18 RX ADMIN — OXYCODONE HYDROCHLORIDE 10 MILLIGRAM(S): 5 TABLET ORAL at 22:15

## 2019-03-18 RX ADMIN — LIDOCAINE 1 PATCH: 4 CREAM TOPICAL at 00:00

## 2019-03-18 RX ADMIN — OXYCODONE HYDROCHLORIDE 10 MILLIGRAM(S): 5 TABLET ORAL at 10:40

## 2019-03-18 RX ADMIN — HEPARIN SODIUM 1100 UNIT(S)/HR: 5000 INJECTION INTRAVENOUS; SUBCUTANEOUS at 16:51

## 2019-03-18 RX ADMIN — Medication 100 MILLIGRAM(S): at 17:36

## 2019-03-18 RX ADMIN — Medication 150 MILLIGRAM(S): at 12:04

## 2019-03-18 NOTE — PROGRESS NOTE ADULT - ASSESSMENT
A/p: 68yFemale with 5th/6th R rib fx and L  fx, PE's awaiting OR tomorrow for ORIF of L DR Moffett.  VSS. NAD.

## 2019-03-18 NOTE — PROGRESS NOTE ADULT - SUBJECTIVE AND OBJECTIVE BOX
68 YOF not on ac s/p fall after slipping on step, pt fell down multiple steps <5 unsure pt was going down stairs in dark. Pt unable to get back up, has left wrist deformity. Pt complaining of neck pain, right sided chest pain, right upper quadrant abdominal pain, generalized back pain. Pt denies any LOC, denies fever, chills cough, no lightheadedness prior to fall, no chest pain prior to fall. Patient found to have a left wrist fx and rib fxs.  Patient awaiting OR. moving fingers without problems  Encouraged deep breathing exercises      MEDICATIONS  (STANDING):  docusate sodium 100 milliGRAM(s) Oral three times a day  famotidine    Tablet 20 milliGRAM(s) Oral two times a day  heparin  Infusion.  Unit(s)/Hr (12 mL/Hr) IV Continuous <Continuous>  lidocaine   Patch 1 Patch Transdermal daily  metoprolol succinate ER 25 milliGRAM(s) Oral daily  multivitamin 1 Tablet(s) Oral daily  sodium chloride 0.9%. 1000 milliLiter(s) (125 mL/Hr) IV Continuous <Continuous>  topiramate 100 milliGRAM(s) Oral two times a day  venlafaxine XR. 150 milliGRAM(s) Oral daily    MEDICATIONS  (PRN):  acetaminophen   Tablet .. 325 milliGRAM(s) Oral every 4 hours PRN Mild Pain (1 - 3)  acetaminophen   Tablet .. 975 milliGRAM(s) Oral every 6 hours PRN Temp greater or equal to 38C (100.4F)  HYDROmorphone  Injectable 0.5 milliGRAM(s) IV Push every 4 hours PRN break through pain  oxyCODONE    IR 5 milliGRAM(s) Oral every 4 hours PRN Moderate Pain (4 - 6)  oxyCODONE    IR 10 milliGRAM(s) Oral every 4 hours PRN Severe Pain (7 - 10)          VITALS:   T(C): 37 (03-18-19 @ 23:33), Max: 37 (03-18-19 @ 23:33)  HR: 76 (03-18-19 @ 23:33) (60 - 91)  BP: 105/67 (03-18-19 @ 23:33) (96/60 - 106/67)  RR: 18 (03-18-19 @ 23:33) (17 - 18)  SpO2: 94% (03-18-19 @ 23:33) (93% - 95%)  Wt(kg): --    Physical Exam  General: WN/WD NAD  Neurology: A&Ox3, nonfocal, [facility] x 4  Eyes: PERRLA/ EOMI, Gross vision intact  ENT/Neck: Neck supple, trachea midline, No JVD, Gross hearing intact  Respiratory: decreased effort due to pain  CV: RRR, S1S2, no murmurs, rubs or gallops  Abdominal: Soft, NT, ND +BS,   Extremities: left wrist in splint  OR rescheduled for 3/19  Skin: No Rashes, Hematoma, Ecchymosis    LABS:                  PT/INR - ( 18 Mar 2019 09:55 )   PT: 13.9 sec;   INR: 1.20 ratio         PTT - ( 18 Mar 2019 16:13 )  PTT:83.0 sec    CAPILLARY BLOOD GLUCOSE          RADIOLOGY & ADDITIONAL TESTS:

## 2019-03-18 NOTE — PROGRESS NOTE ADULT - SUBJECTIVE AND OBJECTIVE BOX
Patient resting without complaints.  No chest pain, SOB, N/V.    T(C): 36.9 (03-18-19 @ 04:45), Max: 37.2 (03-17-19 @ 16:36)  HR: 62 (03-18-19 @ 04:45) (62 - 89)  BP: 105/66 (03-18-19 @ 04:45) (99/63 - 116/74)  RR: 17 (03-18-19 @ 04:45) (17 - 18)  SpO2: 95% (03-18-19 @ 04:45) (94% - 96%)  Wt(kg): --    Exam:  Alert and Oriented, No Acute Distress  LUE splint c/d/i, digits pink,warm, mobile w/ brisk cap refill <2 seconds, SILT  Calves Soft, Non-tender bilaterally                        13.3   7.6   )-----------( 214      ( 16 Mar 2019 06:58 )             39.7    03-16    137  |  105  |  19  ----------------------------<  99  4.2   |  19<L>  |  0.80    Ca    8.7      16 Mar 2019 06:58  Phos  2.4     03-16  Mg     1.8     03-16

## 2019-03-18 NOTE — PROGRESS NOTE ADULT - NSICDXPROBLEM_GEN_ALL_CORE_FT
PROBLEM DIAGNOSES  Problem: Fracture of wrist, left, closed, initial encounter  Assessment and Plan: scheduled for Open reduction and internal fixation of wrist  No contraindication to scheduled procedure  pain meds as needed  awaiting OR      Problem: Multiple fractures of upper extremity and ribs  Assessment and Plan: continue to follow rib fxs   pain meds aas needed  encourage incentive spiromery     Problem: Pulmonary thromboembolism  Assessment and Plan:

## 2019-03-18 NOTE — PROGRESS NOTE ADULT - NSICDXPROBLEM_GEN_ALL_CORE_FT
PROBLEM DIAGNOSES  Problem: Fracture of wrist, left, closed, initial encounter  Assessment and Plan: PT/OT-NWB LUE  IS  DVT PPx-Hgtt  Pain Control  Continue Current Tx.  OR tomorrow  NPO p MN w/ IVF  Abel Gayle PA-C  Team Pager: #[phone]

## 2019-03-19 LAB
ANION GAP SERPL CALC-SCNC: 12 MMOL/L — SIGNIFICANT CHANGE UP (ref 5–17)
APTT BLD: 106 SEC — HIGH (ref 27.5–36.3)
BLD GP AB SCN SERPL QL: NEGATIVE — SIGNIFICANT CHANGE UP
BUN SERPL-MCNC: 14 MG/DL — SIGNIFICANT CHANGE UP (ref 7–23)
CALCIUM SERPL-MCNC: 9.4 MG/DL — SIGNIFICANT CHANGE UP (ref 8.4–10.5)
CHLORIDE SERPL-SCNC: 109 MMOL/L — HIGH (ref 96–108)
CO2 SERPL-SCNC: 21 MMOL/L — LOW (ref 22–31)
CREAT SERPL-MCNC: 0.89 MG/DL — SIGNIFICANT CHANGE UP (ref 0.5–1.3)
GLUCOSE SERPL-MCNC: 96 MG/DL — SIGNIFICANT CHANGE UP (ref 70–99)
HCT VFR BLD CALC: 41.5 % — SIGNIFICANT CHANGE UP (ref 34.5–45)
HGB BLD-MCNC: 13.7 G/DL — SIGNIFICANT CHANGE UP (ref 11.5–15.5)
MCHC RBC-ENTMCNC: 28.8 PG — SIGNIFICANT CHANGE UP (ref 27–34)
MCHC RBC-ENTMCNC: 32.9 GM/DL — SIGNIFICANT CHANGE UP (ref 32–36)
MCV RBC AUTO: 87.5 FL — SIGNIFICANT CHANGE UP (ref 80–100)
PLATELET # BLD AUTO: 259 K/UL — SIGNIFICANT CHANGE UP (ref 150–400)
POTASSIUM SERPL-MCNC: 3.7 MMOL/L — SIGNIFICANT CHANGE UP (ref 3.5–5.3)
POTASSIUM SERPL-SCNC: 3.7 MMOL/L — SIGNIFICANT CHANGE UP (ref 3.5–5.3)
RBC # BLD: 4.74 M/UL — SIGNIFICANT CHANGE UP (ref 3.8–5.2)
RBC # FLD: 13.3 % — SIGNIFICANT CHANGE UP (ref 10.3–14.5)
RH IG SCN BLD-IMP: POSITIVE — SIGNIFICANT CHANGE UP
SODIUM SERPL-SCNC: 142 MMOL/L — SIGNIFICANT CHANGE UP (ref 135–145)
WBC # BLD: 6.3 K/UL — SIGNIFICANT CHANGE UP (ref 3.8–10.5)
WBC # FLD AUTO: 6.3 K/UL — SIGNIFICANT CHANGE UP (ref 3.8–10.5)

## 2019-03-19 PROCEDURE — 25607 OPTX DST RD XARTC FX/EPI SEP: CPT | Mod: LT

## 2019-03-19 RX ORDER — FAMOTIDINE 10 MG/ML
20 INJECTION INTRAVENOUS
Qty: 0 | Refills: 0 | Status: DISCONTINUED | OUTPATIENT
Start: 2019-03-19 | End: 2019-03-22

## 2019-03-19 RX ORDER — ACETAMINOPHEN 500 MG
650 TABLET ORAL EVERY 6 HOURS
Qty: 0 | Refills: 0 | Status: DISCONTINUED | OUTPATIENT
Start: 2019-03-19 | End: 2019-03-22

## 2019-03-19 RX ORDER — HYDROMORPHONE HYDROCHLORIDE 2 MG/ML
0.5 INJECTION INTRAMUSCULAR; INTRAVENOUS; SUBCUTANEOUS
Qty: 0 | Refills: 0 | Status: DISCONTINUED | OUTPATIENT
Start: 2019-03-19 | End: 2019-03-19

## 2019-03-19 RX ORDER — OXYCODONE HYDROCHLORIDE 5 MG/1
5 TABLET ORAL EVERY 4 HOURS
Qty: 0 | Refills: 0 | Status: DISCONTINUED | OUTPATIENT
Start: 2019-03-19 | End: 2019-03-22

## 2019-03-19 RX ORDER — HEPARIN SODIUM 5000 [USP'U]/ML
INJECTION INTRAVENOUS; SUBCUTANEOUS
Qty: 25000 | Refills: 0 | Status: DISCONTINUED | OUTPATIENT
Start: 2019-03-19 | End: 2019-03-21

## 2019-03-19 RX ORDER — DOCUSATE SODIUM 100 MG
100 CAPSULE ORAL THREE TIMES A DAY
Qty: 0 | Refills: 0 | Status: DISCONTINUED | OUTPATIENT
Start: 2019-03-19 | End: 2019-03-22

## 2019-03-19 RX ORDER — METOPROLOL TARTRATE 50 MG
25 TABLET ORAL DAILY
Qty: 0 | Refills: 0 | Status: DISCONTINUED | OUTPATIENT
Start: 2019-03-19 | End: 2019-03-22

## 2019-03-19 RX ORDER — HEPARIN SODIUM 5000 [USP'U]/ML
5500 INJECTION INTRAVENOUS; SUBCUTANEOUS ONCE
Qty: 0 | Refills: 0 | Status: DISCONTINUED | OUTPATIENT
Start: 2019-03-19 | End: 2019-03-19

## 2019-03-19 RX ORDER — VENLAFAXINE HCL 75 MG
150 CAPSULE, EXT RELEASE 24 HR ORAL DAILY
Qty: 0 | Refills: 0 | Status: DISCONTINUED | OUTPATIENT
Start: 2019-03-19 | End: 2019-03-22

## 2019-03-19 RX ORDER — ONDANSETRON 8 MG/1
4 TABLET, FILM COATED ORAL EVERY 6 HOURS
Qty: 0 | Refills: 0 | Status: DISCONTINUED | OUTPATIENT
Start: 2019-03-19 | End: 2019-03-22

## 2019-03-19 RX ORDER — OXYCODONE HYDROCHLORIDE 5 MG/1
10 TABLET ORAL EVERY 4 HOURS
Qty: 0 | Refills: 0 | Status: DISCONTINUED | OUTPATIENT
Start: 2019-03-19 | End: 2019-03-22

## 2019-03-19 RX ORDER — SODIUM CHLORIDE 9 MG/ML
1000 INJECTION, SOLUTION INTRAVENOUS
Qty: 0 | Refills: 0 | Status: DISCONTINUED | OUTPATIENT
Start: 2019-03-19 | End: 2019-03-22

## 2019-03-19 RX ORDER — CEFAZOLIN SODIUM 1 G
2000 VIAL (EA) INJECTION EVERY 8 HOURS
Qty: 0 | Refills: 0 | Status: COMPLETED | OUTPATIENT
Start: 2019-03-19 | End: 2019-03-20

## 2019-03-19 RX ORDER — HEPARIN SODIUM 5000 [USP'U]/ML
5500 INJECTION INTRAVENOUS; SUBCUTANEOUS EVERY 6 HOURS
Qty: 0 | Refills: 0 | Status: DISCONTINUED | OUTPATIENT
Start: 2019-03-19 | End: 2019-03-19

## 2019-03-19 RX ORDER — MAGNESIUM HYDROXIDE 400 MG/1
30 TABLET, CHEWABLE ORAL DAILY
Qty: 0 | Refills: 0 | Status: DISCONTINUED | OUTPATIENT
Start: 2019-03-19 | End: 2019-03-22

## 2019-03-19 RX ORDER — LIDOCAINE 4 G/100G
1 CREAM TOPICAL DAILY
Qty: 0 | Refills: 0 | Status: DISCONTINUED | OUTPATIENT
Start: 2019-03-19 | End: 2019-03-22

## 2019-03-19 RX ORDER — HEPARIN SODIUM 5000 [USP'U]/ML
2500 INJECTION INTRAVENOUS; SUBCUTANEOUS EVERY 6 HOURS
Qty: 0 | Refills: 0 | Status: DISCONTINUED | OUTPATIENT
Start: 2019-03-19 | End: 2019-03-19

## 2019-03-19 RX ORDER — TOPIRAMATE 25 MG
100 TABLET ORAL
Qty: 0 | Refills: 0 | Status: DISCONTINUED | OUTPATIENT
Start: 2019-03-19 | End: 2019-03-22

## 2019-03-19 RX ADMIN — SODIUM CHLORIDE 100 MILLILITER(S): 9 INJECTION, SOLUTION INTRAVENOUS at 13:57

## 2019-03-19 RX ADMIN — Medication 650 MILLIGRAM(S): at 20:21

## 2019-03-19 RX ADMIN — FAMOTIDINE 20 MILLIGRAM(S): 10 INJECTION INTRAVENOUS at 18:36

## 2019-03-19 RX ADMIN — HYDROMORPHONE HYDROCHLORIDE 0.5 MILLIGRAM(S): 2 INJECTION INTRAMUSCULAR; INTRAVENOUS; SUBCUTANEOUS at 14:03

## 2019-03-19 RX ADMIN — HEPARIN SODIUM 1200 UNIT(S)/HR: 5000 INJECTION INTRAVENOUS; SUBCUTANEOUS at 18:34

## 2019-03-19 RX ADMIN — Medication 100 MILLIGRAM(S): at 20:06

## 2019-03-19 RX ADMIN — OXYCODONE HYDROCHLORIDE 5 MILLIGRAM(S): 5 TABLET ORAL at 20:20

## 2019-03-19 RX ADMIN — LIDOCAINE 1 PATCH: 4 CREAM TOPICAL at 00:56

## 2019-03-19 RX ADMIN — LIDOCAINE 1 PATCH: 4 CREAM TOPICAL at 21:48

## 2019-03-19 RX ADMIN — Medication 100 MILLIGRAM(S): at 21:49

## 2019-03-19 RX ADMIN — OXYCODONE HYDROCHLORIDE 5 MILLIGRAM(S): 5 TABLET ORAL at 20:50

## 2019-03-19 RX ADMIN — FAMOTIDINE 20 MILLIGRAM(S): 10 INJECTION INTRAVENOUS at 05:20

## 2019-03-19 RX ADMIN — HYDROMORPHONE HYDROCHLORIDE 0.5 MILLIGRAM(S): 2 INJECTION INTRAMUSCULAR; INTRAVENOUS; SUBCUTANEOUS at 14:15

## 2019-03-19 RX ADMIN — Medication 650 MILLIGRAM(S): at 20:50

## 2019-03-19 RX ADMIN — Medication 100 MILLIGRAM(S): at 05:20

## 2019-03-19 RX ADMIN — ONDANSETRON 4 MILLIGRAM(S): 8 TABLET, FILM COATED ORAL at 14:12

## 2019-03-19 RX ADMIN — SODIUM CHLORIDE 125 MILLILITER(S): 9 INJECTION INTRAMUSCULAR; INTRAVENOUS; SUBCUTANEOUS at 01:04

## 2019-03-19 RX ADMIN — HYDROMORPHONE HYDROCHLORIDE 0.5 MILLIGRAM(S): 2 INJECTION INTRAMUSCULAR; INTRAVENOUS; SUBCUTANEOUS at 14:38

## 2019-03-19 RX ADMIN — SODIUM CHLORIDE 125 MILLILITER(S): 9 INJECTION INTRAMUSCULAR; INTRAVENOUS; SUBCUTANEOUS at 09:12

## 2019-03-19 RX ADMIN — Medication 100 MILLIGRAM(S): at 19:11

## 2019-03-19 RX ADMIN — Medication 25 MILLIGRAM(S): at 05:20

## 2019-03-19 NOTE — PROGRESS NOTE ADULT - ASSESSMENT
69 yo woman with hx of fall at home found to have a left Wrist fx as well as rib fractures. On CT pt found to have subsegmental PEs    Patient is a 68y year old female with PMHx of HTN, HLD,  migraine, osteopenia, anxiety, and lumbar spinal fusion c/b radicular symptoms  and gait instability who presented to the ED after a mechanical fall down 5  step and striking her face against a wall at bottom of step. Pt admitted for  s/p fall. Pt injuries  are,Nondisplaced Anterior Nasal Spine Fracture, Acute  Displaced Fractures of R 5th/6th Ribs, Ulnar Subluxation of the Proximal  Phalanx of the Right Thumb at the MCP,. Acute Comminuted Impacted Fracture of  the Left Distal Radius.     Patient on anticoagulation due to DVT which will be managed by surgery ss the exact timing of OR uncertain.            Patient surgery rescheduled for 3/19  Medically stable to proceed to OR as planned . 67 yo woman with hx of fall at home found to have a left Wrist fx as well as rib fractures. On CT pt found to have subsegmental PEs    Patient is a 68y year old female with PMHx of HTN, HLD,  migraine, osteopenia, anxiety, and lumbar spinal fusion c/b radicular symptoms  and gait instability who presented to the ED after a mechanical fall down 5  step and striking her face against a wall at bottom of step. Pt admitted for  s/p fall. Pt injuries  are,Nondisplaced Anterior Nasal Spine Fracture, Acute  Displaced Fractures of R 5th/6th Ribs, Ulnar Subluxation of the Proximal  Phalanx of the Right Thumb at the MCP,. Acute Comminuted Impacted Fracture of  the Left Distal Radius.  S/P ORIF left radius on 03/19/19. She complains of localized pain at the surgical site, but is able to move all fingers of the left hand.    .

## 2019-03-19 NOTE — PROGRESS NOTE ADULT - ASSESSMENT
A/p: 68yFemale awaiting OR today for ORIF L DR fracture.  Medically cleared for OR, optimized for procedure Orthopaedically.  SAVANNAH. NAD.

## 2019-03-19 NOTE — BRIEF OPERATIVE NOTE - NSICDXBRIEFPROCEDURE_GEN_ALL_CORE_FT
PROCEDURES:  Open reduction and internal fixation (ORIF) of extra-articular fracture of distal radius 19-Mar-2019 13:11:43  Zaid Downey

## 2019-03-19 NOTE — PRE-ANESTHESIA EVALUATION ADULT - RESPIRATORY RATE (BREATHS/MIN)
Request received from patient and pharmacy requesting a refill of lorazepam on behalf of patient. Discussed with Dr. Rand.     Last refill: 12/29/2017  # 30 with 5 refills.  Last office visit:  06/27/2018  Next office visit:  07/19/2018    This is an appropriate refill, and has been e-prescribed. Radha Rosas RN, BSN, OCN         16

## 2019-03-19 NOTE — PROGRESS NOTE ADULT - SUBJECTIVE AND OBJECTIVE BOX
ORTHO POC NOTE    Resting without complaints. Pain controlled.  No Chest Pain, SOB, N/V.    T(C): 36.6 (03-19-19 @ 15:00), Max: 37.1 (03-19-19 @ 10:17)  HR: 90 (03-19-19 @ 15:00) (69 - 91)  BP: 120/69 (03-19-19 @ 15:00) (101/63 - 132/75)  RR: 18 (03-19-19 @ 15:00) (15 - 24)  SpO2: 99% (03-19-19 @ 15:00) (92% - 100%)      Exam:   Alert and Oriented; No Acute Distress  Card: +S1/S2, RRR  Pulm: CTAB  Ext: LUE: Dressing C/D/I, compartments soft, sling in place, fingers warm with dull sensation grossly intact to light touch,  brisk cap refill  Calves soft, non-tender bilaterally  (+) PF/DF  (+) Distal pulses    Postop Xray:                          13.7   6.3   )-----------( 259      ( 19 Mar 2019 04:56 )             41.5    03-19    142  |  109<H>  |  14  ----------------------------<  96  3.7   |  21<L>  |  0.89    Ca    9.4      19 Mar 2019 04:56          Patient is a 68y old  Female s/p Left Distal Radius ORIF      Plan:  - Pain Control PRN  - LUE NWB  - Regular diet  - Venodynes/IS  - Check labs in AM  - Transvaginal US in AM      Kwan Smart PA-C  Orthopedic Surgery  [phone]

## 2019-03-19 NOTE — PROGRESS NOTE ADULT - NSICDXPROBLEM_GEN_ALL_CORE_FT
PROBLEM DIAGNOSES  Problem: Fracture of wrist, left, closed, initial encounter  Assessment and Plan: NWB LUE  IS  DVT PPx- Hold Hgtt prior to sx  Pain Control  Continue Current Tx.  Continue NPO/IVF  OR today  Abel Gayle PA-C  Team Pager: #[phone]

## 2019-03-19 NOTE — PROGRESS NOTE ADULT - SUBJECTIVE AND OBJECTIVE BOX
68 YOF not on ac s/p fall after slipping on step, pt fell down multiple steps <5 unsure pt was going down stairs in dark. Pt unable to get back up, has left wrist deformity. Pt complaining of neck pain, right sided chest pain, right upper quadrant abdominal pain, generalized back pain. Pt denies any LOC, denies fever, chills cough, no lightheadedness prior to fall, no chest pain prior to fall. Patient found to have a left wrist fx and rib fxs.  Patient awaiting OR. moving fingers without problems  Encouraged deep breathing exercises      MEDICATIONS  (STANDING):  docusate sodium 100 milliGRAM(s) Oral three times a day  famotidine    Tablet 20 milliGRAM(s) Oral two times a day  heparin  Infusion.  Unit(s)/Hr (12 mL/Hr) IV Continuous <Continuous>  lactated ringers. 1000 milliLiter(s) (100 mL/Hr) IV Continuous <Continuous>  lidocaine   Patch 1 Patch Transdermal daily  metoprolol succinate ER 25 milliGRAM(s) Oral daily  topiramate 100 milliGRAM(s) Oral two times a day  venlafaxine XR. 150 milliGRAM(s) Oral daily    MEDICATIONS  (PRN):  acetaminophen   Tablet .. 650 milliGRAM(s) Oral every 6 hours PRN Temp greater or equal to 38C (100.4F), Mild Pain (1 - 3)  magnesium hydroxide Suspension 30 milliLiter(s) Oral daily PRN Constipation  ondansetron Injectable 4 milliGRAM(s) IV Push every 6 hours PRN Nausea and/or Vomiting  oxyCODONE    IR 10 milliGRAM(s) Oral every 4 hours PRN Severe Pain (7 - 10)  oxyCODONE    IR 5 milliGRAM(s) Oral every 4 hours PRN Moderate Pain (4 - 6)          VITALS:   T(C): 37 (03-18-19 @ 23:33), Max: 37 (03-18-19 @ 23:33)  HR: 76 (03-18-19 @ 23:33) (60 - 91)  BP: 105/67 (03-18-19 @ 23:33) (96/60 - 106/67)  RR: 18 (03-18-19 @ 23:33) (17 - 18)  SpO2: 94% (03-18-19 @ 23:33) (93% - 95%)  Wt(kg): --    Physical Exam  General: WN/WD NAD  Neurology: A&Ox3, nonfocal, [facility] x 4  Eyes: PERRLA/ EOMI, Gross vision intact  ENT/Neck: Neck supple, trachea midline, No JVD, Gross hearing intact  Respiratory: decreased effort due to pain  CV: RRR, S1S2, no murmurs, rubs or gallops  Abdominal: Soft, NT, ND +BS,   Extremities: left wrist in splint  OR rescheduled for 3/19  Skin: No Rashes, Hematoma, Ecchymosis    LABS:            PT/INR - ( 18 Mar 2019 09:55 )   PT: 13.9 sec;   INR: 1.20 ratio         PTT - ( 18 Mar 2019 16:13 )  PTT:83.0 sec    CAPILLARY BLOOD GLUCOSE          RADIOLOGY & ADDITIONAL TESTS: 68 YOF not on ac s/p fall after slipping on step, pt fell down multiple steps <5 unsure pt was going down stairs in dark. Pt unable to get back up, has left wrist deformity. Pt complaining of neck pain, right sided chest pain, right upper quadrant abdominal pain, generalized back pain. Pt denies any LOC, denies fever, chills cough, no lightheadedness prior to fall, no chest pain prior to fall. Patient found to have a left wrist fx and multiple rib fractures.  S/P ORIF left radius on 03/19/19. She complains of localized pain at the surgical site, but is able to move all fingers of the left hand.    MEDICATIONS  (STANDING):  docusate sodium 100 milliGRAM(s) Oral three times a day  famotidine    Tablet 20 milliGRAM(s) Oral two times a day  heparin  Infusion.  Unit(s)/Hr (12 mL/Hr) IV Continuous <Continuous>  lactated ringers. 1000 milliLiter(s) (100 mL/Hr) IV Continuous <Continuous>  lidocaine   Patch 1 Patch Transdermal daily  metoprolol succinate ER 25 milliGRAM(s) Oral daily  topiramate 100 milliGRAM(s) Oral two times a day  venlafaxine XR. 150 milliGRAM(s) Oral daily    MEDICATIONS  (PRN):  acetaminophen   Tablet .. 650 milliGRAM(s) Oral every 6 hours PRN Temp greater or equal to 38C (100.4F), Mild Pain (1 - 3)  magnesium hydroxide Suspension 30 milliLiter(s) Oral daily PRN Constipation  ondansetron Injectable 4 milliGRAM(s) IV Push every 6 hours PRN Nausea and/or Vomiting  oxyCODONE    IR 10 milliGRAM(s) Oral every 4 hours PRN Severe Pain (7 - 10)  oxyCODONE    IR 5 milliGRAM(s) Oral every 4 hours PRN Moderate Pain (4 - 6)          VITALS:   T(C): 37 (03-18-19 @ 23:33), Max: 37 (03-18-19 @ 23:33)  HR: 76 (03-18-19 @ 23:33) (60 - 91)  BP: 105/67 (03-18-19 @ 23:33) (96/60 - 106/67)  RR: 18 (03-18-19 @ 23:33) (17 - 18)  SpO2: 94% (03-18-19 @ 23:33) (93% - 95%)  Wt(kg): --    Physical Exam  General: WN/WD NAD  Neurology: A&Ox3, nonfocal, [facility] x 4  Eyes: PERRLA/ EOMI, Gross vision intact  ENT/Neck: Neck supple, trachea midline, No JVD, Gross hearing intact  Respiratory: decreased effort due to pain  CV: RRR, S1S2, no murmurs, rubs or gallops  Abdominal: Soft, NT, ND +BS,   Extremities: left wrist in splint  OR rescheduled for 3/19  Skin: No Rashes, Hematoma, Ecchymosis    LABS:            PT/INR - ( 18 Mar 2019 09:55 )   PT: 13.9 sec;   INR: 1.20 ratio         PTT - ( 18 Mar 2019 16:13 )  PTT:83.0 sec    CAPILLARY BLOOD GLUCOSE          RADIOLOGY & ADDITIONAL TESTS:

## 2019-03-19 NOTE — PROGRESS NOTE ADULT - SUBJECTIVE AND OBJECTIVE BOX
Patient resting without complaints.  No chest pain, SOB, N/V.  NPO p MN, receiving IVF    T(C): 36.8 (03-19-19 @ 04:28), Max: 37 (03-18-19 @ 23:33)  HR: 71 (03-19-19 @ 04:28) (60 - 91)  BP: 110/70 (03-19-19 @ 04:28) (96/60 - 110/70)  RR: 18 (03-19-19 @ 04:28) (17 - 18)  SpO2: 97% (03-19-19 @ 04:28) (93% - 97%)  Wt(kg): --    Exam:  Alert and Oriented, No Acute Distress  LUE in sling/splint, digits pink, warm, mobile, SILT, brisk cap refill <2 seconds  RUE grossly intact, mobile, pink, warm  Calves Soft, Non-tender bilaterally                        13.7   6.3   )-----------( 259      ( 19 Mar 2019 04:56 )             41.5    03-19    142  |  109<H>  |  14  ----------------------------<  96  3.7   |  21<L>  |  0.89    Ca    9.4      19 Mar 2019 04:56    PT/INR - ( 18 Mar 2019 09:55 )   PT: 13.9 sec;   INR: 1.20 ratio         PTT - ( 19 Mar 2019 04:56 )  PTT:106.0 sec

## 2019-03-19 NOTE — PACU DISCHARGE NOTE - NSPTMEETSDISCHCRITERIADT_GEN_A_CORE
LRTI  RVP pending  suspect viral in origin initially, however, WBC and sx and clinical picture support bacterial infection  would dc HOME with Levaquin 750 mg PO daily x 7 days  pt needs Mucinex 600 mg PO BID around the clock  pt will need follow up with PMD, discussed with pmd and family  discussed with TIFFANY CRAVEN
19-Mar-2019 15:34

## 2019-03-19 NOTE — PROGRESS NOTE ADULT - ATTENDING COMMENTS
Displaced L DR ADKINS indicated for ORIF. Heparin held, all RBAs discussed. All questions answered. Informed consnet obtrained.

## 2019-03-19 NOTE — PROGRESS NOTE ADULT - ATTENDING COMMENTS
The patient is medically stable, medically optimized and has no medical contraindication to surgery tomorrow as required. Exam time 30 minutes including > than 50 % for bedside discussion and counseling. No medical complications post-op to date and to proceed with physical therapy, as tolerated. Continues pulmonary toilet to lessen atelectasis, leg exercises as taught to lessen the risk of DVT and supervised pain medications for post-op pain control.

## 2019-03-20 DIAGNOSIS — R52 PAIN, UNSPECIFIED: ICD-10-CM

## 2019-03-20 DIAGNOSIS — S22.39XA FRACTURE OF ONE RIB, UNSPECIFIED SIDE, INITIAL ENCOUNTER FOR CLOSED FRACTURE: ICD-10-CM

## 2019-03-20 DIAGNOSIS — I82.409 ACUTE EMBOLISM AND THROMBOSIS OF UNSPECIFIED DEEP VEINS OF UNSPECIFIED LOWER EXTREMITY: ICD-10-CM

## 2019-03-20 LAB
ANION GAP SERPL CALC-SCNC: 14 MMOL/L — SIGNIFICANT CHANGE UP (ref 5–17)
APTT BLD: 82.3 SEC — HIGH (ref 27.5–36.3)
APTT BLD: 97.8 SEC — HIGH (ref 27.5–36.3)
BASOPHILS # BLD AUTO: 0.04 K/UL — SIGNIFICANT CHANGE UP (ref 0–0.2)
BASOPHILS NFR BLD AUTO: 0.5 % — SIGNIFICANT CHANGE UP (ref 0–2)
BUN SERPL-MCNC: 11 MG/DL — SIGNIFICANT CHANGE UP (ref 7–23)
CALCIUM SERPL-MCNC: 9 MG/DL — SIGNIFICANT CHANGE UP (ref 8.4–10.5)
CHLORIDE SERPL-SCNC: 107 MMOL/L — SIGNIFICANT CHANGE UP (ref 96–108)
CO2 SERPL-SCNC: 20 MMOL/L — LOW (ref 22–31)
CREAT SERPL-MCNC: 0.94 MG/DL — SIGNIFICANT CHANGE UP (ref 0.5–1.3)
EOSINOPHIL # BLD AUTO: 0.12 K/UL — SIGNIFICANT CHANGE UP (ref 0–0.5)
EOSINOPHIL NFR BLD AUTO: 1.6 % — SIGNIFICANT CHANGE UP (ref 0–6)
GLUCOSE SERPL-MCNC: 150 MG/DL — HIGH (ref 70–99)
HCT VFR BLD CALC: 36.4 % — SIGNIFICANT CHANGE UP (ref 34.5–45)
HCT VFR BLD CALC: 37.1 % — SIGNIFICANT CHANGE UP (ref 34.5–45)
HGB BLD-MCNC: 11.4 G/DL — LOW (ref 11.5–15.5)
HGB BLD-MCNC: 11.8 G/DL — SIGNIFICANT CHANGE UP (ref 11.5–15.5)
IMM GRANULOCYTES NFR BLD AUTO: 1.3 % — SIGNIFICANT CHANGE UP (ref 0–1.5)
LYMPHOCYTES # BLD AUTO: 1.57 K/UL — SIGNIFICANT CHANGE UP (ref 1–3.3)
LYMPHOCYTES # BLD AUTO: 21 % — SIGNIFICANT CHANGE UP (ref 13–44)
MCHC RBC-ENTMCNC: 27.3 PG — SIGNIFICANT CHANGE UP (ref 27–34)
MCHC RBC-ENTMCNC: 28.4 PG — SIGNIFICANT CHANGE UP (ref 27–34)
MCHC RBC-ENTMCNC: 30.7 GM/DL — LOW (ref 32–36)
MCHC RBC-ENTMCNC: 32.5 GM/DL — SIGNIFICANT CHANGE UP (ref 32–36)
MCV RBC AUTO: 87.5 FL — SIGNIFICANT CHANGE UP (ref 80–100)
MCV RBC AUTO: 88.8 FL — SIGNIFICANT CHANGE UP (ref 80–100)
MONOCYTES # BLD AUTO: 0.6 K/UL — SIGNIFICANT CHANGE UP (ref 0–0.9)
MONOCYTES NFR BLD AUTO: 8 % — SIGNIFICANT CHANGE UP (ref 2–14)
NEUTROPHILS # BLD AUTO: 5.04 K/UL — SIGNIFICANT CHANGE UP (ref 1.8–7.4)
NEUTROPHILS NFR BLD AUTO: 67.6 % — SIGNIFICANT CHANGE UP (ref 43–77)
PLATELET # BLD AUTO: 258 K/UL — SIGNIFICANT CHANGE UP (ref 150–400)
PLATELET # BLD AUTO: 262 K/UL — SIGNIFICANT CHANGE UP (ref 150–400)
POTASSIUM SERPL-MCNC: 3.6 MMOL/L — SIGNIFICANT CHANGE UP (ref 3.5–5.3)
POTASSIUM SERPL-SCNC: 3.6 MMOL/L — SIGNIFICANT CHANGE UP (ref 3.5–5.3)
RBC # BLD: 4.16 M/UL — SIGNIFICANT CHANGE UP (ref 3.8–5.2)
RBC # BLD: 4.18 M/UL — SIGNIFICANT CHANGE UP (ref 3.8–5.2)
RBC # FLD: 13.4 % — SIGNIFICANT CHANGE UP (ref 10.3–14.5)
RBC # FLD: 14.5 % — SIGNIFICANT CHANGE UP (ref 10.3–14.5)
SODIUM SERPL-SCNC: 141 MMOL/L — SIGNIFICANT CHANGE UP (ref 135–145)
WBC # BLD: 7.47 K/UL — SIGNIFICANT CHANGE UP (ref 3.8–10.5)
WBC # BLD: 7.5 K/UL — SIGNIFICANT CHANGE UP (ref 3.8–10.5)
WBC # FLD AUTO: 7.47 K/UL — SIGNIFICANT CHANGE UP (ref 3.8–10.5)
WBC # FLD AUTO: 7.5 K/UL — SIGNIFICANT CHANGE UP (ref 3.8–10.5)

## 2019-03-20 PROCEDURE — 76830 TRANSVAGINAL US NON-OB: CPT | Mod: 26

## 2019-03-20 RX ORDER — RIVAROXABAN 15 MG-20MG
15 KIT ORAL
Qty: 0 | Refills: 0 | Status: DISCONTINUED | OUTPATIENT
Start: 2019-03-21 | End: 2019-03-22

## 2019-03-20 RX ORDER — RIVAROXABAN 15 MG-20MG
20 KIT ORAL EVERY 24 HOURS
Qty: 0 | Refills: 0 | Status: CANCELLED | OUTPATIENT
Start: 2019-04-12 | End: 2019-03-22

## 2019-03-20 RX ADMIN — Medication 100 MILLIGRAM(S): at 03:46

## 2019-03-20 RX ADMIN — LIDOCAINE 1 PATCH: 4 CREAM TOPICAL at 23:00

## 2019-03-20 RX ADMIN — OXYCODONE HYDROCHLORIDE 5 MILLIGRAM(S): 5 TABLET ORAL at 10:30

## 2019-03-20 RX ADMIN — Medication 100 MILLIGRAM(S): at 06:11

## 2019-03-20 RX ADMIN — OXYCODONE HYDROCHLORIDE 5 MILLIGRAM(S): 5 TABLET ORAL at 01:56

## 2019-03-20 RX ADMIN — FAMOTIDINE 20 MILLIGRAM(S): 10 INJECTION INTRAVENOUS at 06:11

## 2019-03-20 RX ADMIN — HEPARIN SODIUM 1200 UNIT(S)/HR: 5000 INJECTION INTRAVENOUS; SUBCUTANEOUS at 01:51

## 2019-03-20 RX ADMIN — LIDOCAINE 1 PATCH: 4 CREAM TOPICAL at 08:24

## 2019-03-20 RX ADMIN — OXYCODONE HYDROCHLORIDE 5 MILLIGRAM(S): 5 TABLET ORAL at 22:00

## 2019-03-20 RX ADMIN — Medication 150 MILLIGRAM(S): at 11:40

## 2019-03-20 RX ADMIN — OXYCODONE HYDROCHLORIDE 5 MILLIGRAM(S): 5 TABLET ORAL at 16:48

## 2019-03-20 RX ADMIN — LIDOCAINE 1 PATCH: 4 CREAM TOPICAL at 11:41

## 2019-03-20 RX ADMIN — Medication 100 MILLIGRAM(S): at 17:54

## 2019-03-20 RX ADMIN — Medication 100 MILLIGRAM(S): at 13:44

## 2019-03-20 RX ADMIN — OXYCODONE HYDROCHLORIDE 5 MILLIGRAM(S): 5 TABLET ORAL at 02:30

## 2019-03-20 RX ADMIN — LIDOCAINE 1 PATCH: 4 CREAM TOPICAL at 09:35

## 2019-03-20 RX ADMIN — FAMOTIDINE 20 MILLIGRAM(S): 10 INJECTION INTRAVENOUS at 17:54

## 2019-03-20 RX ADMIN — LIDOCAINE 1 PATCH: 4 CREAM TOPICAL at 19:21

## 2019-03-20 RX ADMIN — Medication 25 MILLIGRAM(S): at 06:11

## 2019-03-20 RX ADMIN — Medication 100 MILLIGRAM(S): at 22:00

## 2019-03-20 RX ADMIN — HEPARIN SODIUM 1200 UNIT(S)/HR: 5000 INJECTION INTRAVENOUS; SUBCUTANEOUS at 08:26

## 2019-03-20 RX ADMIN — OXYCODONE HYDROCHLORIDE 5 MILLIGRAM(S): 5 TABLET ORAL at 10:00

## 2019-03-20 RX ADMIN — OXYCODONE HYDROCHLORIDE 5 MILLIGRAM(S): 5 TABLET ORAL at 17:18

## 2019-03-20 RX ADMIN — OXYCODONE HYDROCHLORIDE 5 MILLIGRAM(S): 5 TABLET ORAL at 22:30

## 2019-03-20 NOTE — DIETITIAN INITIAL EVALUATION ADULT. - OTHER INFO
Pt seen for length of stay on 7TOW. Pt reports fair po intake in-patient, has no food complaints, however states given the pain she is just "not in the mood of eating." Declines all forms of supplementation at this time. Pt reports her current wt is 150 lbs, as reflected by dosing wt, and she had gained some wt over the past year secondary to eating more. She would like to lose the wt upon discharge. Pt denies chewing/swallowing difficulties. No c/o nausea, vomiting, diarrhea, or constipation. No nutrition related questions at this time, states BP controlled PTA and does not need education on heart healthy diet at this time. See recommendations below Pt seen for length of stay on 7TOW. Pt reports fair po intake in-patient, has no food complaints, however states given the pain she is just "not in the mood of eating." Declines all forms of supplementation at this time. Pt reports her current wt is 150 lbs, as reflected by dosing wt, and she had gained some wt over the past year secondary to eating more. She would like to lose the wt upon discharge. Pt weights per chart noted with fluctuations: 152 lbs (03-15) and 160 lbs (03-16), pt noted with fluid shifts vs question accuracy. Pt denies chewing/swallowing difficulties. No c/o nausea, vomiting, diarrhea, or constipation. No nutrition related questions at this time, states BP controlled PTA and does not need education on heart healthy diet at this time. See recommendations below

## 2019-03-20 NOTE — DIETITIAN INITIAL EVALUATION ADULT. - ENERGY NEEDS
Height: 64 inches, Weight: 150 pounds (dosing)  BMI: 25.7 kg/m2 IBW: 120 pounds (+/-10%), %IBW: 125%  Pertinent Info: 1+ edema to L hand, L arm noted, no pressure injuries noted at this time in nursing flow sheet.  Other pertinent info: Pt is 68yoF with PMH significant for HTN, HLD, lumbar spinal fusion, anxiety, presenting s/p mechanical fall down 5 steps with the following injuries per chart: "Nondisplaced Anterior Nasal Spine Fracture, Acute Displaced Fractures of R 5th/6th Ribs, Ulnar Subluxation of the Proximal  Phalanx of the Right Thumb at the MCP, Acute Comminuted Impacted Fracture of the Left Distal Radius, s/p ORIF of left distal radius fracture on 3/19.

## 2019-03-20 NOTE — DIETITIAN INITIAL EVALUATION ADULT. - ORAL INTAKE PTA
pt reports good po intake PTA. NKFA reported. Pt endorses taking a vitamin D and vitamin B12 supplement PTA./good

## 2019-03-20 NOTE — PROGRESS NOTE ADULT - SUBJECTIVE AND OBJECTIVE BOX
Post op Day [1 ]    Patient resting without complaints.  No chest pain, SOB, N/V.    T(C): 37.1 (03-20-19 @ 04:17), Max: 37.2 (03-19-19 @ 23:51)  HR: 72 (03-20-19 @ 04:17) (69 - 90)  BP: 124/69 (03-20-19 @ 04:17) (104/56 - 132/75)  RR: 16 (03-20-19 @ 04:17) (15 - 24)  SpO2: 96% (03-20-19 @ 04:17) (92% - 100%)  Wt(kg): --    Exam:  Alert and Oriented, No Acute Distress  LUE dsg c/d/i, digits pink, warm, mobile, SILT, brisk cap refill <2 seconds.  Calves Soft, Non-tender bilaterally                        11.8   7.5   )-----------( 262      ( 20 Mar 2019 00:38 )             36.4    03-19    142  |  109<H>  |  14  ----------------------------<  96  3.7   |  21<L>  |  0.89    Ca    9.4      19 Mar 2019 04:56

## 2019-03-20 NOTE — PROGRESS NOTE ADULT - NSICDXPROBLEM_GEN_ALL_CORE_FT
PROBLEM DIAGNOSES  Problem: Fracture of wrist, left, closed, initial encounter  Assessment and Plan: PT/OT-NWB LUE, ice/elevate  IS  DVT PPx  Pain Control  Continue Current Tx.  Abel Gayle PA-C  Team Pager: #[phone]

## 2019-03-20 NOTE — DIETITIAN INITIAL EVALUATION ADULT. - PHYSICAL APPEARANCE
well nourished/BMI 25.7, Pt visually appears well nourished with no signs of muscle wasting or fat depletion.

## 2019-03-20 NOTE — PROGRESS NOTE ADULT - SUBJECTIVE AND OBJECTIVE BOX
68 YOF not on ac s/p fall after slipping on step, pt fell down multiple steps <5 unsure pt was going down stairs in dark. Pt unable to get back up, has left wrist deformity. Pt complaining of neck pain, right sided chest pain, right upper quadrant abdominal pain, generalized back pain. Pt denies any LOC, denies fever, chills cough, no lightheadedness prior to fall, no chest pain prior to fall. Patient found to have a left wrist fx and multiple rib fractures.  S/P ORIF left radius on 03/19/19. She complains of localized pain at the surgical site, but is able to move all fingers of the left hand.    MEDICATIONS  (STANDING):  docusate sodium 100 milliGRAM(s) Oral three times a day  famotidine    Tablet 20 milliGRAM(s) Oral two times a day  heparin  Infusion.  Unit(s)/Hr (12 mL/Hr) IV Continuous <Continuous>  lactated ringers. 1000 milliLiter(s) (100 mL/Hr) IV Continuous <Continuous>  lidocaine   Patch 1 Patch Transdermal daily  metoprolol succinate ER 25 milliGRAM(s) Oral daily  topiramate 100 milliGRAM(s) Oral two times a day  venlafaxine XR. 150 milliGRAM(s) Oral daily    MEDICATIONS  (PRN):  acetaminophen   Tablet .. 650 milliGRAM(s) Oral every 6 hours PRN Temp greater or equal to 38C (100.4F), Mild Pain (1 - 3)  magnesium hydroxide Suspension 30 milliLiter(s) Oral daily PRN Constipation  ondansetron Injectable 4 milliGRAM(s) IV Push every 6 hours PRN Nausea and/or Vomiting  oxyCODONE    IR 10 milliGRAM(s) Oral every 4 hours PRN Severe Pain (7 - 10)  oxyCODONE    IR 5 milliGRAM(s) Oral every 4 hours PRN Moderate Pain (4 - 6)          VITALS:   T(C): 36.7 (03-20-19 @ 21:40), Max: 37.4 (03-20-19 @ 14:26)  HR: 71 (03-20-19 @ 21:40) (71 - 108)  BP: 123/71 (03-20-19 @ 21:40) (107/68 - 128/71)  RR: 18 (03-20-19 @ 21:40) (16 - 18)  SpO2: 95% (03-20-19 @ 21:40) (94% - 96%)  Wt(kg): --    Physical Exam  General: WN/WD NAD  Neurology: A&Ox3, nonfocal, [facility] x 4  Eyes: PERRLA/ EOMI, Gross vision intact  ENT/Neck: Neck supple, trachea midline, No JVD, Gross hearing intact  Respiratory: decreased effort due to pain  CV: RRR, S1S2, no murmurs, rubs or gallops  Abdominal: Soft, NT, ND +BS,   Extremities: left wrist in splint  OR rescheduled for 3/19  Skin: No Rashes, Hematoma, Ecchymosis    LABS:        CBC Full  -  ( 20 Mar 2019 08:40 )  WBC Count : 7.47 K/uL  Hemoglobin : 11.4 g/dL  Hematocrit : 37.1 %  Platelet Count - Automated : 258 K/uL  Mean Cell Volume : 88.8 fl  Mean Cell Hemoglobin : 27.3 pg  Mean Cell Hemoglobin Concentration : 30.7 gm/dL  Auto Neutrophil # : 5.04 K/uL  Auto Lymphocyte # : 1.57 K/uL  Auto Monocyte # : 0.60 K/uL  Auto Eosinophil # : 0.12 K/uL  Auto Basophil # : 0.04 K/uL  Auto Neutrophil % : 67.6 %  Auto Lymphocyte % : 21.0 %  Auto Monocyte % : 8.0 %  Auto Eosinophil % : 1.6 %  Auto Basophil % : 0.5 %    03-20    141  |  107  |  11  ----------------------------<  150<H>  3.6   |  20<L>  |  0.94    Ca    9.0      20 Mar 2019 06:36        PTT - ( 20 Mar 2019 07:49 )  PTT:82.3 sec    CAPILLARY BLOOD GLUCOSE          RADIOLOGY & ADDITIONAL TESTS:

## 2019-03-20 NOTE — CHART NOTE - NSCHARTNOTEFT_GEN_A_CORE
d/w plastic surgery attending regarding nasal fracture/R thumb  There is a consult dictated in the EMR from ED visit  follow up outpatient for nasal fracture  if pt has pain in R thumb, may follow up outpatient as well  pt currently w/o pain R thumb after reduction in ED  d/w Dr Matta

## 2019-03-20 NOTE — DIETITIAN INITIAL EVALUATION ADULT. - PT NOT SOURCE
pt seems tearful at time of assessment, c/o some pain, noted with h/o anxiety. Family at bedside./other (specify)

## 2019-03-20 NOTE — DIETITIAN INITIAL EVALUATION ADULT. - NS AS NUTRI INTERV MEALS SNACK
Continue to provide current diet order, no therapeutic diet restrictions indicated at this time. Continue to obtain/provide food preferences as feasible. Continue to offer nutritional supplementation as indicated, pt declines at this time. RD remains available./General/healthful diet

## 2019-03-20 NOTE — PROGRESS NOTE ADULT - ASSESSMENT
67 yo woman with hx of fall at home found to have a left Wrist fx as well as rib fractures. On CT pt found to have subsegmental PEs    Patient is a 68y year old female with PMHx of HTN, HLD,  migraine, osteopenia, anxiety, and lumbar spinal fusion c/b radicular symptoms  and gait instability who presented to the ED after a mechanical fall down 5  step and striking her face against a wall at bottom of step. Pt admitted for  s/p fall. Pt injuries  are,Nondisplaced Anterior Nasal Spine Fracture, Acute  Displaced Fractures of R 5th/6th Ribs, Ulnar Subluxation of the Proximal  Phalanx of the Right Thumb at the MCP,. Acute Comminuted Impacted Fracture of  the Left Distal Radius.  S/P ORIF left radius on 03/19/19. She complains of localized pain at the surgical site, but is able to move all fingers of the left hand.    .

## 2019-03-21 LAB
APTT BLD: 88.6 SEC — HIGH (ref 27.5–36.3)
HCT VFR BLD CALC: 35.1 % — SIGNIFICANT CHANGE UP (ref 34.5–45)
HGB BLD-MCNC: 11.1 G/DL — LOW (ref 11.5–15.5)
MCHC RBC-ENTMCNC: 27.3 PG — SIGNIFICANT CHANGE UP (ref 27–34)
MCHC RBC-ENTMCNC: 31.6 GM/DL — LOW (ref 32–36)
MCV RBC AUTO: 86.5 FL — SIGNIFICANT CHANGE UP (ref 80–100)
PLATELET # BLD AUTO: 288 K/UL — SIGNIFICANT CHANGE UP (ref 150–400)
RBC # BLD: 4.06 M/UL — SIGNIFICANT CHANGE UP (ref 3.8–5.2)
RBC # FLD: 14.4 % — SIGNIFICANT CHANGE UP (ref 10.3–14.5)
WBC # BLD: 6.52 K/UL — SIGNIFICANT CHANGE UP (ref 3.8–10.5)
WBC # FLD AUTO: 6.52 K/UL — SIGNIFICANT CHANGE UP (ref 3.8–10.5)

## 2019-03-21 RX ORDER — METOPROLOL TARTRATE 50 MG
1 TABLET ORAL
Qty: 30 | Refills: 0 | OUTPATIENT
Start: 2019-03-21 | End: 2019-04-19

## 2019-03-21 RX ORDER — ACETAMINOPHEN 500 MG
2 TABLET ORAL
Qty: 0 | Refills: 0 | COMMUNITY
Start: 2019-03-21

## 2019-03-21 RX ORDER — OXYCODONE HYDROCHLORIDE 5 MG/1
1 TABLET ORAL
Qty: 60 | Refills: 0 | OUTPATIENT
Start: 2019-03-21

## 2019-03-21 RX ORDER — METOPROLOL TARTRATE 50 MG
1 TABLET ORAL
Qty: 0 | Refills: 0 | COMMUNITY

## 2019-03-21 RX ORDER — LIDOCAINE 4 G/100G
1 CREAM TOPICAL
Qty: 7 | Refills: 0 | OUTPATIENT
Start: 2019-03-21 | End: 2019-03-27

## 2019-03-21 RX ORDER — RIVAROXABAN 15 MG-20MG
1 KIT ORAL
Qty: 42 | Refills: 0 | OUTPATIENT
Start: 2019-03-21 | End: 2019-04-10

## 2019-03-21 RX ORDER — DOCUSATE SODIUM 100 MG
1 CAPSULE ORAL
Qty: 0 | Refills: 0 | COMMUNITY
Start: 2019-03-21

## 2019-03-21 RX ADMIN — Medication 25 MILLIGRAM(S): at 06:06

## 2019-03-21 RX ADMIN — Medication 100 MILLIGRAM(S): at 22:29

## 2019-03-21 RX ADMIN — RIVAROXABAN 15 MILLIGRAM(S): KIT at 08:47

## 2019-03-21 RX ADMIN — OXYCODONE HYDROCHLORIDE 10 MILLIGRAM(S): 5 TABLET ORAL at 11:23

## 2019-03-21 RX ADMIN — Medication 650 MILLIGRAM(S): at 22:29

## 2019-03-21 RX ADMIN — OXYCODONE HYDROCHLORIDE 5 MILLIGRAM(S): 5 TABLET ORAL at 06:04

## 2019-03-21 RX ADMIN — OXYCODONE HYDROCHLORIDE 10 MILLIGRAM(S): 5 TABLET ORAL at 22:31

## 2019-03-21 RX ADMIN — Medication 100 MILLIGRAM(S): at 06:06

## 2019-03-21 RX ADMIN — Medication 150 MILLIGRAM(S): at 12:17

## 2019-03-21 RX ADMIN — FAMOTIDINE 20 MILLIGRAM(S): 10 INJECTION INTRAVENOUS at 17:20

## 2019-03-21 RX ADMIN — OXYCODONE HYDROCHLORIDE 10 MILLIGRAM(S): 5 TABLET ORAL at 22:59

## 2019-03-21 RX ADMIN — Medication 100 MILLIGRAM(S): at 17:20

## 2019-03-21 RX ADMIN — LIDOCAINE 1 PATCH: 4 CREAM TOPICAL at 12:17

## 2019-03-21 RX ADMIN — OXYCODONE HYDROCHLORIDE 10 MILLIGRAM(S): 5 TABLET ORAL at 10:53

## 2019-03-21 RX ADMIN — FAMOTIDINE 20 MILLIGRAM(S): 10 INJECTION INTRAVENOUS at 06:06

## 2019-03-21 RX ADMIN — Medication 650 MILLIGRAM(S): at 06:06

## 2019-03-21 RX ADMIN — LIDOCAINE 1 PATCH: 4 CREAM TOPICAL at 22:26

## 2019-03-21 RX ADMIN — Medication 650 MILLIGRAM(S): at 22:59

## 2019-03-21 NOTE — PROGRESS NOTE ADULT - SUBJECTIVE AND OBJECTIVE BOX
68 YOF not on ac s/p fall after slipping on step, pt fell down multiple steps <5 unsure pt was going down stairs in dark. Pt unable to get back up, has left wrist deformity. Pt complaining of neck pain, right sided chest pain, right upper quadrant abdominal pain, generalized back pain. Pt denies any LOC, denies fever, chills cough, no lightheadedness prior to fall, no chest pain prior to fall. Patient found to have a left wrist fx and multiple rib fractures.  S/P ORIF left radius on 03/19/19. She complains of localized pain at the surgical site, but is able to move all fingers of the left hand. Patient with complaint of migraine headaches    MEDICATIONS  (STANDING):  docusate sodium 100 milliGRAM(s) Oral three times a day  famotidine    Tablet 20 milliGRAM(s) Oral two times a day  lactated ringers. 1000 milliLiter(s) (100 mL/Hr) IV Continuous <Continuous>  lidocaine   Patch 1 Patch Transdermal daily  metoprolol succinate ER 25 milliGRAM(s) Oral daily  rivaroxaban 15 milliGRAM(s) Oral two times a day  topiramate 100 milliGRAM(s) Oral two times a day  venlafaxine XR. 150 milliGRAM(s) Oral daily    MEDICATIONS  (PRN):  acetaminophen   Tablet .. 650 milliGRAM(s) Oral every 6 hours PRN Temp greater or equal to 38C (100.4F), Mild Pain (1 - 3)  bisacodyl Suppository 10 milliGRAM(s) Rectal daily PRN If no bowel movement  magnesium hydroxide Suspension 30 milliLiter(s) Oral daily PRN Constipation  ondansetron Injectable 4 milliGRAM(s) IV Push every 6 hours PRN Nausea and/or Vomiting  oxyCODONE    IR 10 milliGRAM(s) Oral every 4 hours PRN Severe Pain (7 - 10)  oxyCODONE    IR 5 milliGRAM(s) Oral every 4 hours PRN Moderate Pain (4 - 6)          VITALS:   T(C): 37.4 (03-21-19 @ 15:40), Max: 37.4 (03-21-19 @ 15:40)  HR: 93 (03-21-19 @ 15:40) (67 - 93)  BP: 106/- (03-21-19 @ 15:40) (105/64 - 123/71)  RR: 18 (03-21-19 @ 15:40) (18 - 18)  SpO2: 98% (03-21-19 @ 15:40) (93% - 98%)  Wt(kg): --    Physical Exam  General: WN/WD NAD  Neurology: A&Ox3, nonfocal, [place] x 4  Eyes: PERRLA/ EOMI, Gross vision intact  ENT/Neck: Neck supple, trachea midline, No JVD, Gross hearing intact  Respiratory: decreased effort due to pain  CV: RRR, S1S2, no murmurs, rubs or gallops  Abdominal: Soft, NT, ND +BS,   Extremities: left wrist in splint  OR rescheduled for 3/19  Skin: No Rashes, Hematoma, Ecchymosis    LABS:        CBC Full  -  ( 21 Mar 2019 09:22 )  WBC Count : 6.52 K/uL  Hemoglobin : 11.1 g/dL  Hematocrit : 35.1 %  Platelet Count - Automated : 288 K/uL  Mean Cell Volume : 86.5 fl  Mean Cell Hemoglobin : 27.3 pg  Mean Cell Hemoglobin Concentration : 31.6 gm/dL  Auto Neutrophil # : x  Auto Lymphocyte # : x  Auto Monocyte # : x  Auto Eosinophil # : x  Auto Basophil # : x  Auto Neutrophil % : x  Auto Lymphocyte % : x  Auto Monocyte % : x  Auto Eosinophil % : x  Auto Basophil % : x    03-20    141  |  107  |  11  ----------------------------<  150<H>  3.6   |  20<L>  |  0.94    Ca    9.0      20 Mar 2019 06:36        PTT - ( 21 Mar 2019 08:57 )  PTT:88.6 sec    CAPILLARY BLOOD GLUCOSE          RADIOLOGY & ADDITIONAL TESTS:

## 2019-03-21 NOTE — PROGRESS NOTE ADULT - PROBLEM SELECTOR PLAN 1
PT/OT-NWB LUE, may bear weight through elbow.  IS  DVT PPx-switch to Th Xarelto today  Pain Control  Continue Current Tx.  Discharge planning    Abel Gayle PA-C  Team Pager: #[phone]

## 2019-03-21 NOTE — PROGRESS NOTE ADULT - SUBJECTIVE AND OBJECTIVE BOX
Post op Day [ 2]    Patient resting without complaints.  No chest pain, SOB, N/V.  Pain improving.    T(C): 36.7 (03-21-19 @ 04:44), Max: 37.4 (03-20-19 @ 14:26)  HR: 74 (03-21-19 @ 04:44) (67 - 108)  BP: 113/72 (03-21-19 @ 04:44) (105/64 - 128/71)  RR: 18 (03-21-19 @ 04:44) (18 - 18)  SpO2: 94% (03-21-19 @ 04:44) (93% - 95%)  Wt(kg): --    Exam:  Alert and Oriented, No Acute Distress  L wrist splint c/d/i with pink, warm, mobile digits with brisk cap refill <2 seconds and SILT  Calves Soft, Non-tender bilaterally                        11.4   7.47  )-----------( 258      ( 20 Mar 2019 08:40 )             37.1    03-20    141  |  107  |  11  ----------------------------<  150<H>  3.6   |  20<L>  |  0.94    Ca    9.0      20 Mar 2019 06:36

## 2019-03-22 ENCOUNTER — TRANSCRIPTION ENCOUNTER (OUTPATIENT)
Age: 69
End: 2019-03-22

## 2019-03-22 VITALS
HEART RATE: 72 BPM | RESPIRATION RATE: 18 BRPM | SYSTOLIC BLOOD PRESSURE: 109 MMHG | DIASTOLIC BLOOD PRESSURE: 62 MMHG | TEMPERATURE: 98 F | OXYGEN SATURATION: 96 %

## 2019-03-22 LAB
HCT VFR BLD CALC: 35.5 % — SIGNIFICANT CHANGE UP (ref 34.5–45)
HGB BLD-MCNC: 11.6 G/DL — SIGNIFICANT CHANGE UP (ref 11.5–15.5)
MCHC RBC-ENTMCNC: 28.6 PG — SIGNIFICANT CHANGE UP (ref 27–34)
MCHC RBC-ENTMCNC: 32.6 GM/DL — SIGNIFICANT CHANGE UP (ref 32–36)
MCV RBC AUTO: 87.6 FL — SIGNIFICANT CHANGE UP (ref 80–100)
PLATELET # BLD AUTO: 321 K/UL — SIGNIFICANT CHANGE UP (ref 150–400)
RBC # BLD: 4.06 M/UL — SIGNIFICANT CHANGE UP (ref 3.8–5.2)
RBC # FLD: 13.3 % — SIGNIFICANT CHANGE UP (ref 10.3–14.5)
WBC # BLD: 5.2 K/UL — SIGNIFICANT CHANGE UP (ref 3.8–10.5)
WBC # FLD AUTO: 5.2 K/UL — SIGNIFICANT CHANGE UP (ref 3.8–10.5)

## 2019-03-22 PROCEDURE — 80048 BASIC METABOLIC PNL TOTAL CA: CPT

## 2019-03-22 PROCEDURE — 83880 ASSAY OF NATRIURETIC PEPTIDE: CPT

## 2019-03-22 PROCEDURE — 72125 CT NECK SPINE W/O DYE: CPT

## 2019-03-22 PROCEDURE — 80053 COMPREHEN METABOLIC PANEL: CPT

## 2019-03-22 PROCEDURE — 85730 THROMBOPLASTIN TIME PARTIAL: CPT

## 2019-03-22 PROCEDURE — 84100 ASSAY OF PHOSPHORUS: CPT

## 2019-03-22 PROCEDURE — 70486 CT MAXILLOFACIAL W/O DYE: CPT

## 2019-03-22 PROCEDURE — 73060 X-RAY EXAM OF HUMERUS: CPT

## 2019-03-22 PROCEDURE — 93005 ELECTROCARDIOGRAM TRACING: CPT

## 2019-03-22 PROCEDURE — 97116 GAIT TRAINING THERAPY: CPT

## 2019-03-22 PROCEDURE — 90715 TDAP VACCINE 7 YRS/> IM: CPT

## 2019-03-22 PROCEDURE — 97161 PT EVAL LOW COMPLEX 20 MIN: CPT

## 2019-03-22 PROCEDURE — 76000 FLUOROSCOPY <1 HR PHYS/QHP: CPT

## 2019-03-22 PROCEDURE — 81001 URINALYSIS AUTO W/SCOPE: CPT

## 2019-03-22 PROCEDURE — 86850 RBC ANTIBODY SCREEN: CPT

## 2019-03-22 PROCEDURE — 97530 THERAPEUTIC ACTIVITIES: CPT

## 2019-03-22 PROCEDURE — 70450 CT HEAD/BRAIN W/O DYE: CPT

## 2019-03-22 PROCEDURE — 72170 X-RAY EXAM OF PELVIS: CPT

## 2019-03-22 PROCEDURE — 97168 OT RE-EVAL EST PLAN CARE: CPT

## 2019-03-22 PROCEDURE — 71260 CT THORAX DX C+: CPT

## 2019-03-22 PROCEDURE — 73090 X-RAY EXAM OF FOREARM: CPT

## 2019-03-22 PROCEDURE — 86901 BLOOD TYPING SEROLOGIC RH(D): CPT

## 2019-03-22 PROCEDURE — 85027 COMPLETE CBC AUTOMATED: CPT

## 2019-03-22 PROCEDURE — 73100 X-RAY EXAM OF WRIST: CPT

## 2019-03-22 PROCEDURE — 76830 TRANSVAGINAL US NON-OB: CPT

## 2019-03-22 PROCEDURE — 97165 OT EVAL LOW COMPLEX 30 MIN: CPT

## 2019-03-22 PROCEDURE — 85610 PROTHROMBIN TIME: CPT

## 2019-03-22 PROCEDURE — 74177 CT ABD & PELVIS W/CONTRAST: CPT

## 2019-03-22 PROCEDURE — 73110 X-RAY EXAM OF WRIST: CPT

## 2019-03-22 PROCEDURE — 86900 BLOOD TYPING SEROLOGIC ABO: CPT

## 2019-03-22 PROCEDURE — C1713: CPT

## 2019-03-22 PROCEDURE — 73120 X-RAY EXAM OF HAND: CPT

## 2019-03-22 PROCEDURE — 71045 X-RAY EXAM CHEST 1 VIEW: CPT

## 2019-03-22 PROCEDURE — 83735 ASSAY OF MAGNESIUM: CPT

## 2019-03-22 PROCEDURE — 93970 EXTREMITY STUDY: CPT

## 2019-03-22 PROCEDURE — 73130 X-RAY EXAM OF HAND: CPT

## 2019-03-22 RX ADMIN — Medication 100 MILLIGRAM(S): at 06:41

## 2019-03-22 RX ADMIN — FAMOTIDINE 20 MILLIGRAM(S): 10 INJECTION INTRAVENOUS at 06:42

## 2019-03-22 RX ADMIN — LIDOCAINE 1 PATCH: 4 CREAM TOPICAL at 00:02

## 2019-03-22 RX ADMIN — Medication 25 MILLIGRAM(S): at 06:41

## 2019-03-22 RX ADMIN — RIVAROXABAN 15 MILLIGRAM(S): KIT at 06:41

## 2019-03-22 NOTE — PROGRESS NOTE ADULT - REASON FOR ADMISSION
Fall, Rib Fractures, Wrist Fracture
Fall, Rib Fractures, L Wrist Fracture
Fall, Rib Fractures, Wrist Fracture

## 2019-03-22 NOTE — PROGRESS NOTE ADULT - ASSESSMENT
69 yo woman with hx of fall at home found to have a left Wrist fx as well as rib fractures. On CT pt found to have subsegmental PEs    Patient is a 68y year old female with PMHx of HTN, HLD,  migraine, osteopenia, anxiety, and lumbar spinal fusion c/b radicular symptoms  and gait instability who presented to the ED after a mechanical fall down 5  step and striking her face against a wall at bottom of step. Pt admitted for  s/p fall. Pt injuries  are,Nondisplaced Anterior Nasal Spine Fracture, Acute  Displaced Fractures of R 5th/6th Ribs, Ulnar Subluxation of the Proximal  Phalanx of the Right Thumb at the MCP,. Acute Comminuted Impacted Fracture of  the Left Distal Radius.  S/P ORIF left radius on 03/19/19. She complains of localized pain at the surgical site, but is able to move all fingers of the left hand.    .

## 2019-03-22 NOTE — PROGRESS NOTE ADULT - SUBJECTIVE AND OBJECTIVE BOX
68 YOF not on ac s/p fall after slipping on step, pt fell down multiple steps <5 unsure pt was going down stairs in dark. Pt unable to get back up, has left wrist deformity. Pt complaining of neck pain, right sided chest pain, right upper quadrant abdominal pain, generalized back pain. Pt denies any LOC, denies fever, chills cough, no lightheadedness prior to fall, no chest pain prior to fall. Patient found to have a left wrist fx and multiple rib fractures.  S/P ORIF left radius on 03/19/19. She complains of localized pain at the surgical site, but is able to move all fingers of the left hand. Patient with complaint of migraine headaches    MEDICATIONS  (STANDING):  docusate sodium 100 milliGRAM(s) Oral three times a day  famotidine    Tablet 20 milliGRAM(s) Oral two times a day  lactated ringers. 1000 milliLiter(s) (100 mL/Hr) IV Continuous <Continuous>  lidocaine   Patch 1 Patch Transdermal daily  metoprolol succinate ER 25 milliGRAM(s) Oral daily  rivaroxaban 15 milliGRAM(s) Oral two times a day  topiramate 100 milliGRAM(s) Oral two times a day  venlafaxine XR. 150 milliGRAM(s) Oral daily    MEDICATIONS  (PRN):  acetaminophen   Tablet .. 650 milliGRAM(s) Oral every 6 hours PRN Temp greater or equal to 38C (100.4F), Mild Pain (1 - 3)  bisacodyl Suppository 10 milliGRAM(s) Rectal daily PRN If no bowel movement  magnesium hydroxide Suspension 30 milliLiter(s) Oral daily PRN Constipation  ondansetron Injectable 4 milliGRAM(s) IV Push every 6 hours PRN Nausea and/or Vomiting  oxyCODONE    IR 10 milliGRAM(s) Oral every 4 hours PRN Severe Pain (7 - 10)  oxyCODONE    IR 5 milliGRAM(s) Oral every 4 hours PRN Moderate Pain (4 - 6)      Vital Signs Last 24 Hrs  T(C): 36.8 (22 Mar 2019 09:45), Max: 37.4 (21 Mar 2019 15:40)  T(F): 98.2 (22 Mar 2019 09:45), Max: 99.4 (21 Mar 2019 15:40)  HR: 72 (22 Mar 2019 09:45) (65 - 93)  BP: 109/62 (22 Mar 2019 09:45) (106/- - 123/78)  BP(mean): --  RR: 18 (22 Mar 2019 09:45) (18 - 18)  SpO2: 96% (22 Mar 2019 09:45) (94% - 98%)    Physical Exam  General: WN/WD NAD  Neurology: A&Ox3, nonfocal, [place] x 4  Eyes: PERRLA/ EOMI, Gross vision intact  ENT/Neck: Neck supple, trachea midline, No JVD, Gross hearing intact  Respiratory: decreased effort due to pain Non-op  rib fractures  CV: RRR, S1S2, no murmurs, rubs or gallops  Abdominal: Soft, NT, ND +BS,   Extremities: left wrist in splint  orif left Distal radius fracture  Skin: No Rashes, Hematoma, Ecchymosis    LABS:          CBC Full  -  ( 22 Mar 2019 07:27 )  WBC Count : 5.2 K/uL  Hemoglobin : 11.6 g/dL  Hematocrit : 35.5 %  Platelet Count - Automated : 321 K/uL  Mean Cell Volume : 87.6 fl  Mean Cell Hemoglobin : 28.6 pg  Mean Cell Hemoglobin Concentration : 32.6 gm/dL  Auto Neutrophil # : x  Auto Lymphocyte # : x  Auto Monocyte # : x  Auto Eosinophil # : x  Auto Basophil # : x  Auto Neutrophil % : x  Auto Lymphocyte % : x  Auto Monocyte % : x  Auto Eosinophil % : x  Auto Basophil % : x            PTT - ( 21 Mar 2019 08:57 )  PTT:88.6 sec      CBC Full  -  ( 21 Mar 2019 09:22 )  WBC Count : 6.52 K/uL  Hemoglobin : 11.1 g/dL  Hematocrit : 35.1 %  Platelet Count - Automated : 288 K/uL  Mean Cell Volume : 86.5 fl  Mean Cell Hemoglobin : 27.3 pg  Mean Cell Hemoglobin Concentration : 31.6 gm/dL  Auto Neutrophil # : x  Auto Lymphocyte # : x  Auto Monocyte # : x  Auto Eosinophil # : x  Auto Basophil # : x  Auto Neutrophil % : x  Auto Lymphocyte % : x  Auto Monocyte % : x  Auto Eosinophil % : x  Auto Basophil % : x    03-20    141  |  107  |  11  ----------------------------<  150<H>  3.6   |  20<L>  |  0.94    Ca    9.0      20 Mar 2019 06:36        PTT - ( 21 Mar 2019 08:57 )  PTT:88.6 sec    CAPILLARY BLOOD GLUCOSE          RADIOLOGY & ADDITIONAL TESTS:

## 2019-03-22 NOTE — PROGRESS NOTE ADULT - NSHPATTENDINGPLANDISCUSS_GEN_ALL_CORE
Patient and staff

## 2019-03-22 NOTE — DISCHARGE NOTE NURSING/CASE MANAGEMENT/SOCIAL WORK - NSDCDPATPORTLINK_GEN_ALL_CORE
You can access the [facility] Patient Portal, offered by [facility], by registering with the following website: [email or web address]

## 2019-03-22 NOTE — PROGRESS NOTE ADULT - SUBJECTIVE AND OBJECTIVE BOX
Post op Day [ 2]    Patient resting without complaints.  No chest pain, SOB, N/V.  Pain improving.    Vital Signs Last 24 Hrs  T(C): 36.9 (22 Mar 2019 05:25), Max: 37.4 (21 Mar 2019 15:40)  T(F): 98.4 (22 Mar 2019 05:25), Max: 99.4 (21 Mar 2019 15:40)  HR: 75 (22 Mar 2019 05:25) (65 - 93)  BP: 123/78 (22 Mar 2019 05:25) (106/- - 123/78)  BP(mean): --  RR: 18 (22 Mar 2019 05:25) (18 - 18)  SpO2: 98% (22 Mar 2019 05:25) (94% - 98%)      Exam:  Alert and Oriented, No Acute Distress  L wrist splint c/d/i with pink, warm, mobile digits with brisk cap refill <2 seconds and SILT  Calves Soft, Non-tender bilaterally         Assessment and Plan:   · Assessment		  A/p: 68yFemale s/p ORIF L  fx and nonop rib fractures.       Problem/Plan - 1:  ·  Problem: Fracture of wrist, left, closed, initial encounter.  Plan: PT/OT-NWB LUE, may bear weight through elbow.  IS  DVT PPx  Xarelto  Pain Control  Continue Current Tx.  Discharge planning--home today

## 2019-03-25 ENCOUNTER — INBOUND DOCUMENT (OUTPATIENT)
Age: 69
End: 2019-03-25

## 2019-03-26 PROBLEM — M85.80 OTHER SPECIFIED DISORDERS OF BONE DENSITY AND STRUCTURE, UNSPECIFIED SITE: Chronic | Status: ACTIVE | Noted: 2019-03-14

## 2019-03-26 PROBLEM — E78.5 HYPERLIPIDEMIA, UNSPECIFIED: Chronic | Status: ACTIVE | Noted: 2019-03-14

## 2019-03-26 PROBLEM — M54.5 LOW BACK PAIN: Chronic | Status: ACTIVE | Noted: 2019-03-14

## 2019-03-26 PROBLEM — I10 ESSENTIAL (PRIMARY) HYPERTENSION: Chronic | Status: ACTIVE | Noted: 2019-03-14

## 2019-03-26 PROBLEM — G43.909 MIGRAINE, UNSPECIFIED, NOT INTRACTABLE, WITHOUT STATUS MIGRAINOSUS: Chronic | Status: ACTIVE | Noted: 2019-03-14

## 2019-03-26 PROBLEM — F41.9 ANXIETY DISORDER, UNSPECIFIED: Chronic | Status: ACTIVE | Noted: 2019-03-14

## 2019-03-28 ENCOUNTER — APPOINTMENT (OUTPATIENT)
Dept: ORTHOPEDIC SURGERY | Facility: CLINIC | Age: 69
End: 2019-03-28
Payer: MEDICARE

## 2019-03-28 VITALS
DIASTOLIC BLOOD PRESSURE: 79 MMHG | HEIGHT: 63 IN | HEART RATE: 100 BPM | SYSTOLIC BLOOD PRESSURE: 114 MMHG | BODY MASS INDEX: 25.69 KG/M2 | WEIGHT: 145 LBS

## 2019-03-28 DIAGNOSIS — S52.532D COLLES' FRACTURE OF LEFT RADIUS, SUBSEQUENT ENCOUNTER FOR CLOSED FRACTURE WITH ROUTINE HEALING: ICD-10-CM

## 2019-03-28 PROCEDURE — 99024 POSTOP FOLLOW-UP VISIT: CPT

## 2019-03-28 PROCEDURE — 73110 X-RAY EXAM OF WRIST: CPT | Mod: LT

## 2019-04-08 NOTE — HISTORY OF PRESENT ILLNESS
[de-identified] : s/p BURNO CHEN DR Fx 3/19 [de-identified] : Doing well, pain controlled. no issues, denies f/c, n/t [de-identified] : NAD\par Splint removed\par Incision CDI\par Steris in placed\par 5/5 ain/pin/un\par SILT MUR\par 2+ Radial pulse [de-identified] : XR of R wrist shows well positioned R DR Fx with appropriately positioned implants [de-identified] : 69yo F s/p ORIF R  Fx 3/19/19 doing well [de-identified] : -Cock up wrist splint applied, may removed\par -ROMAT, NWB, Hand therapy Rx placed\par -f/u in 4wks for new XRs and begin WBAT

## 2020-06-02 ENCOUNTER — TRANSCRIPTION ENCOUNTER (OUTPATIENT)
Age: 70
End: 2020-06-02

## 2020-06-30 NOTE — DIETITIAN INITIAL EVALUATION ADULT. - WEIGHT IN KG
[Alert] : alert [Oriented x 3] : ~L oriented x 3 [Well Nourished] : well nourished [Conjunctiva Non-injected] : conjunctiva non-injected [No Visual Lymphadenopathy] : no visual  lymphadenopathy [No Edema] : no edema [No Clubbing] : no clubbing [No Bromhidrosis] : no bromhidrosis 54.4 [Full Body Skin Exam Performed] : performed [No Chromhidrosis] : no chromhidrosis [FreeTextEntry3] : WHI R eyebrow and L temple\par L medial midback 4 x 6 mm asymmetric brown and pink very thin papule\par stuck on tan papules trunk\par 2 gritty pink papules frontal scalp/forehead

## 2023-01-11 ENCOUNTER — NON-APPOINTMENT (OUTPATIENT)
Age: 73
End: 2023-01-11

## 2024-03-18 NOTE — PROGRESS NOTE ADULT - PROBLEM SELECTOR PROBLEM 5
Patient advised through [facility] My Chart   
The patient dropped off the stool sample to the [facility]. The container did not have the patient's name on it for the test for C-diff so the test was cancelled. Do you want to reorder this? Please advise   
Pain

## 2024-11-07 NOTE — ED ADULT NURSE NOTE - CAS EDN DISCHARGE ASSESSMENT
CC:  Herman Eaton is here today for Office Visit and Cough   Minor fevers. Cough is keeping up at night. Also consistent during the day. He's on day 3-4.    Medications have been reviewed and updated and No medications are needed to be refilled at this time    Patient preferred phone number: [phone]  Ok to leave detailed message on voicemail and Prefers communication and results via [email or web address]       COVID-19 Vaccine (1 - Pediatric 2024-25 season)  Never done           Following review of the above:  Patient is not proceeding with: COVID-19    Note: Refer to final orders and clinician documentation.           Patient baseline mental status/Alert and oriented to person, place and time/Awake

## 2024-12-09 ENCOUNTER — EMERGENCY (EMERGENCY)
Facility: HOSPITAL | Age: 74
LOS: 1 days | Discharge: HOME | End: 2024-12-09
Attending: STUDENT IN AN ORGANIZED HEALTH CARE EDUCATION/TRAINING PROGRAM
Payer: COMMERCIAL

## 2024-12-09 VITALS
TEMPERATURE: 98 F | WEIGHT: 149.91 LBS | HEART RATE: 95 BPM | DIASTOLIC BLOOD PRESSURE: 75 MMHG | SYSTOLIC BLOOD PRESSURE: 133 MMHG | OXYGEN SATURATION: 100 % | RESPIRATION RATE: 20 BRPM | HEIGHT: 63 IN

## 2024-12-09 DIAGNOSIS — Z98.890 OTHER SPECIFIED POSTPROCEDURAL STATES: Chronic | ICD-10-CM

## 2024-12-09 DIAGNOSIS — Z98.1 ARTHRODESIS STATUS: Chronic | ICD-10-CM

## 2024-12-09 PROCEDURE — 99284 EMERGENCY DEPT VISIT MOD MDM: CPT | Mod: 25

## 2024-12-09 PROCEDURE — 71046 X-RAY EXAM CHEST 2 VIEWS: CPT

## 2024-12-09 PROCEDURE — 71046 X-RAY EXAM CHEST 2 VIEWS: CPT | Mod: 26

## 2024-12-09 PROCEDURE — 70450 CT HEAD/BRAIN W/O DYE: CPT | Mod: MC

## 2024-12-09 PROCEDURE — 72125 CT NECK SPINE W/O DYE: CPT | Mod: MC

## 2024-12-09 PROCEDURE — 99284 EMERGENCY DEPT VISIT MOD MDM: CPT

## 2024-12-09 RX ORDER — ACETAMINOPHEN 500MG 500 MG/1
975 TABLET, COATED ORAL ONCE
Refills: 0 | Status: COMPLETED | OUTPATIENT
Start: 2024-12-09 | End: 2024-12-09

## 2024-12-09 RX ORDER — METHOCARBAMOL 500 MG/1
500 TABLET, FILM COATED ORAL ONCE
Refills: 0 | Status: COMPLETED | OUTPATIENT
Start: 2024-12-09 | End: 2024-12-09

## 2024-12-09 RX ORDER — LIDOCAINE 40 MG/G
1 CREAM TOPICAL ONCE
Refills: 0 | Status: COMPLETED | OUTPATIENT
Start: 2024-12-09 | End: 2024-12-09

## 2024-12-09 RX ADMIN — LIDOCAINE 1 PATCH: 40 CREAM TOPICAL at 20:06

## 2024-12-09 RX ADMIN — METHOCARBAMOL 500 MILLIGRAM(S): 500 TABLET, FILM COATED ORAL at 20:29

## 2024-12-09 RX ADMIN — ACETAMINOPHEN 500MG 975 MILLIGRAM(S): 500 TABLET, COATED ORAL at 20:06
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
## 2024-12-09 NOTE — ED PROVIDER NOTE - PROGRESS NOTE DETAILS
appreciate GI input, improving menation  cont. lactulose and Rixamin   f/u repeat ammonia  further management as per GI  speech eval - recommend regular diet natalya- pt reassessed, with some remaining neck pain but mostly to the right paracervical. C collar cleared, no midline cs ttp, some right cs ttp. will dc w muscle relaxant and nsaid w return precautions
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
## 2024-12-09 NOTE — ED ADULT TRIAGE NOTE - AVIAN FLU SYMPTOMS
1/6/2021 Patient: Matheus Westfall YOB: 1945 Date of Visit: 1/6/2021 Pavel Coyle MD 
[facility]. [address] [place] [address],[id number] [address] [employer] Dear Pavel Coyle MD, Thank you for referring Ms. Matheus Westfall to [address] [place] [address] for evaluation. My notes for this consultation are attached. If you have questions, please do not hesitate to call me. I look forward to following your patient along with you. Sincerely, Brent Block MD 
 

No
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
## 2024-12-09 NOTE — ED PROVIDER NOTE - CARE PLAN
1 Principal Discharge DX:	Acute headache due to traumatic injury of head   Principal Discharge DX:	Headache syndrome

## 2024-12-09 NOTE — ED PROVIDER NOTE - PATIENT PORTAL LINK FT
You can access the FollowMyHealth Patient Portal offered by [facility] by registering at the following website: [email or web address]. By joining [employer] FollowMyHealth portal, you will also be able to view your health information using other applications (apps) compatible with our system.

## 2024-12-09 NOTE — ED PROVIDER NOTE - PHYSICAL EXAMINATION
GENERAL: Awake, alert, NAD  HEENT: NC/AT, moist mucous membranes, PERRL, EOMI, no hemotympanum, no periorbital ecchymosis   LUNGS: CTAB, no wheezes or crackles   CARDIAC: RRR, no m/r/g  BACK: mild C spine and T spine midline tenderness   NEURO: CN2-12 grossly intact, A&Ox4, MS +5/5 in UE and LE BL, gross sensation intact in UE and LE BL,   SKIN: Warm and dry. No rash.  PSYCH: Normal affect.

## 2024-12-10 VITALS
TEMPERATURE: 98 F | HEART RATE: 66 BPM | RESPIRATION RATE: 20 BRPM | DIASTOLIC BLOOD PRESSURE: 86 MMHG | SYSTOLIC BLOOD PRESSURE: 118 MMHG | OXYGEN SATURATION: 99 %

## 2024-12-10 PROCEDURE — 72125 CT NECK SPINE W/O DYE: CPT | Mod: 26,MC

## 2024-12-10 PROCEDURE — 70450 CT HEAD/BRAIN W/O DYE: CPT | Mod: 26,MC

## 2024-12-10 RX ORDER — METHOCARBAMOL 500 MG/1
2 TABLET, FILM COATED ORAL
Qty: 30 | Refills: 0
Start: 2024-12-10 | End: 2024-12-14

## 2024-12-10 RX ORDER — IBUPROFEN 200 MG
1 TABLET ORAL
Qty: 15 | Refills: 0
Start: 2024-12-10 | End: 2024-12-14